# Patient Record
Sex: MALE | Race: ASIAN | NOT HISPANIC OR LATINO | URBAN - METROPOLITAN AREA
[De-identification: names, ages, dates, MRNs, and addresses within clinical notes are randomized per-mention and may not be internally consistent; named-entity substitution may affect disease eponyms.]

---

## 2020-06-22 ENCOUNTER — INPATIENT (INPATIENT)
Facility: HOSPITAL | Age: 70
LOS: 1 days | Discharge: LEFT BEFORE DISCHARGE | End: 2020-06-22
Attending: INTERNAL MEDICINE | Admitting: INTERNAL MEDICINE
Payer: MEDICARE

## 2020-06-22 VITALS
RESPIRATION RATE: 16 BRPM | TEMPERATURE: 98 F | DIASTOLIC BLOOD PRESSURE: 89 MMHG | OXYGEN SATURATION: 98 % | HEART RATE: 80 BPM | SYSTOLIC BLOOD PRESSURE: 139 MMHG

## 2020-06-22 VITALS
RESPIRATION RATE: 18 BRPM | TEMPERATURE: 98 F | SYSTOLIC BLOOD PRESSURE: 162 MMHG | OXYGEN SATURATION: 98 % | HEART RATE: 69 BPM | DIASTOLIC BLOOD PRESSURE: 90 MMHG

## 2020-06-22 DIAGNOSIS — R55 SYNCOPE AND COLLAPSE: ICD-10-CM

## 2020-06-22 LAB
ALBUMIN SERPL ELPH-MCNC: 3.9 G/DL — SIGNIFICANT CHANGE UP (ref 3.3–5)
ALP SERPL-CCNC: 42 U/L — SIGNIFICANT CHANGE UP (ref 40–120)
ALT FLD-CCNC: 23 U/L — SIGNIFICANT CHANGE UP (ref 4–41)
ANION GAP SERPL CALC-SCNC: 15 MMO/L — HIGH (ref 7–14)
APPEARANCE UR: CLEAR — SIGNIFICANT CHANGE UP
AST SERPL-CCNC: 31 U/L — SIGNIFICANT CHANGE UP (ref 4–40)
BASOPHILS # BLD AUTO: 0.03 K/UL — SIGNIFICANT CHANGE UP (ref 0–0.2)
BASOPHILS NFR BLD AUTO: 0.5 % — SIGNIFICANT CHANGE UP (ref 0–2)
BILIRUB SERPL-MCNC: 0.9 MG/DL — SIGNIFICANT CHANGE UP (ref 0.2–1.2)
BILIRUB UR-MCNC: NEGATIVE — SIGNIFICANT CHANGE UP
BLOOD UR QL VISUAL: NEGATIVE — SIGNIFICANT CHANGE UP
BUN SERPL-MCNC: 13 MG/DL — SIGNIFICANT CHANGE UP (ref 7–23)
CALCIUM SERPL-MCNC: 9.2 MG/DL — SIGNIFICANT CHANGE UP (ref 8.4–10.5)
CHLORIDE SERPL-SCNC: 99 MMOL/L — SIGNIFICANT CHANGE UP (ref 98–107)
CO2 SERPL-SCNC: 24 MMOL/L — SIGNIFICANT CHANGE UP (ref 22–31)
COLOR SPEC: SIGNIFICANT CHANGE UP
CREAT SERPL-MCNC: 0.89 MG/DL — SIGNIFICANT CHANGE UP (ref 0.5–1.3)
EOSINOPHIL # BLD AUTO: 0.08 K/UL — SIGNIFICANT CHANGE UP (ref 0–0.5)
EOSINOPHIL NFR BLD AUTO: 1.2 % — SIGNIFICANT CHANGE UP (ref 0–6)
GLUCOSE SERPL-MCNC: 106 MG/DL — HIGH (ref 70–99)
GLUCOSE UR-MCNC: NEGATIVE — SIGNIFICANT CHANGE UP
HCT VFR BLD CALC: 43.2 % — SIGNIFICANT CHANGE UP (ref 39–50)
HGB BLD-MCNC: 14.6 G/DL — SIGNIFICANT CHANGE UP (ref 13–17)
IMM GRANULOCYTES NFR BLD AUTO: 0.5 % — SIGNIFICANT CHANGE UP (ref 0–1.5)
KETONES UR-MCNC: NEGATIVE — SIGNIFICANT CHANGE UP
LEUKOCYTE ESTERASE UR-ACNC: NEGATIVE — SIGNIFICANT CHANGE UP
LYMPHOCYTES # BLD AUTO: 1.75 K/UL — SIGNIFICANT CHANGE UP (ref 1–3.3)
LYMPHOCYTES # BLD AUTO: 26.6 % — SIGNIFICANT CHANGE UP (ref 13–44)
MCHC RBC-ENTMCNC: 30.5 PG — SIGNIFICANT CHANGE UP (ref 27–34)
MCHC RBC-ENTMCNC: 33.8 % — SIGNIFICANT CHANGE UP (ref 32–36)
MCV RBC AUTO: 90.4 FL — SIGNIFICANT CHANGE UP (ref 80–100)
MONOCYTES # BLD AUTO: 0.75 K/UL — SIGNIFICANT CHANGE UP (ref 0–0.9)
MONOCYTES NFR BLD AUTO: 11.4 % — SIGNIFICANT CHANGE UP (ref 2–14)
NEUTROPHILS # BLD AUTO: 3.93 K/UL — SIGNIFICANT CHANGE UP (ref 1.8–7.4)
NEUTROPHILS NFR BLD AUTO: 59.8 % — SIGNIFICANT CHANGE UP (ref 43–77)
NITRITE UR-MCNC: NEGATIVE — SIGNIFICANT CHANGE UP
NRBC # FLD: 0 K/UL — SIGNIFICANT CHANGE UP (ref 0–0)
PH UR: 7 — SIGNIFICANT CHANGE UP (ref 5–8)
PLATELET # BLD AUTO: 222 K/UL — SIGNIFICANT CHANGE UP (ref 150–400)
PMV BLD: 10.1 FL — SIGNIFICANT CHANGE UP (ref 7–13)
POTASSIUM SERPL-MCNC: 3.7 MMOL/L — SIGNIFICANT CHANGE UP (ref 3.5–5.3)
POTASSIUM SERPL-SCNC: 3.7 MMOL/L — SIGNIFICANT CHANGE UP (ref 3.5–5.3)
PROT SERPL-MCNC: 7 G/DL — SIGNIFICANT CHANGE UP (ref 6–8.3)
PROT UR-MCNC: NEGATIVE — SIGNIFICANT CHANGE UP
RBC # BLD: 4.78 M/UL — SIGNIFICANT CHANGE UP (ref 4.2–5.8)
RBC # FLD: 13.3 % — SIGNIFICANT CHANGE UP (ref 10.3–14.5)
SODIUM SERPL-SCNC: 138 MMOL/L — SIGNIFICANT CHANGE UP (ref 135–145)
SP GR SPEC: 1.01 — SIGNIFICANT CHANGE UP (ref 1–1.04)
TROPONIN T, HIGH SENSITIVITY: 8 NG/L — SIGNIFICANT CHANGE UP (ref ?–14)
UROBILINOGEN FLD QL: NORMAL — SIGNIFICANT CHANGE UP
WBC # BLD: 6.57 K/UL — SIGNIFICANT CHANGE UP (ref 3.8–10.5)
WBC # FLD AUTO: 6.57 K/UL — SIGNIFICANT CHANGE UP (ref 3.8–10.5)

## 2020-06-22 PROCEDURE — 99285 EMERGENCY DEPT VISIT HI MDM: CPT
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
## 2020-06-22 NOTE — ED PROVIDER NOTE - PHYSICAL EXAMINATION
Gen: well appearing male NAD   HEENT: NCAT, EOMI and nonpainful, visual fields intact, visual acuity grossly intact, no erythema to conjunctiva, no discharge, no proptosis, pupils 3mm and equal no nasal discharge, mucous membranes moist  CV: RRR, +S1/S2, no M/R/G  Resp: CTAB, no W/R/R  GI: Abdomen soft non-distended, NTTP, no masses  MSK: No open wounds, no bruising, no LE edema  Neuro: A&Ox4, following commands, moving all four extremities spontaneously  Psych: appropriate mood

## 2020-06-22 NOTE — CHART NOTE - NSCHARTNOTEFT_GEN_A_CORE
:[id number]  Pt was admitted to medicine for syncope workup. After he was admitted, but was still in ED.  He decided that he wants to  go home and will pursue outpatient workup. Risk of leaving including death was explained to patient and he expressed understanding and is fully oriented. Case discussed with Dr. Melgar.   Pt left AMA.  Evelio Sawyer, Internal Medicine  [id number]
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
## 2020-06-23 LAB
CULTURE RESULTS: SIGNIFICANT CHANGE UP
SARS-COV-2 RNA SPEC QL NAA+PROBE: SIGNIFICANT CHANGE UP
SPECIMEN SOURCE: SIGNIFICANT CHANGE UP

## 2020-07-16 ENCOUNTER — APPOINTMENT (OUTPATIENT)
Dept: MRI IMAGING | Facility: IMAGING CENTER | Age: 70
End: 2020-07-16

## 2021-09-24 ENCOUNTER — APPOINTMENT (OUTPATIENT)
Dept: UROLOGY | Facility: CLINIC | Age: 71
End: 2021-09-24
Payer: MEDICARE

## 2021-09-24 VITALS
HEIGHT: 68 IN | RESPIRATION RATE: 16 BRPM | BODY MASS INDEX: 25.61 KG/M2 | TEMPERATURE: 98.6 F | SYSTOLIC BLOOD PRESSURE: 134 MMHG | DIASTOLIC BLOOD PRESSURE: 74 MMHG | HEART RATE: 70 BPM | WEIGHT: 169 LBS

## 2021-09-24 DIAGNOSIS — K40.90 UNILATERAL INGUINAL HERNIA, W/OUT OBSTRUCTION OR GANGRENE, NOT SPECIFIED AS RECURRENT: ICD-10-CM

## 2021-09-24 PROCEDURE — 99204 OFFICE O/P NEW MOD 45 MIN: CPT

## 2021-09-24 NOTE — HISTORY OF PRESENT ILLNESS
[FreeTextEntry1] : P/t is a 71y/o M with a pmh of htn and partial gastrectomy who is a new patient in our office today for elevated PSA.  Also h/o BPH/LUTS for past >10 years.\par Reports weak stream, occasional streaming, postvoid dribbling, nocturia x1.\par Not overly bothered by LUTS and reports mild.  Not interested in medication.\par \par Recent PSA is 7.8.\par Pt states his PSA has been rising over past couple of years.  No family hx of CaP. [Urinary Incontinence] : no urinary incontinence [Dysuria] : no dysuria [Hematuria - Gross] : no gross hematuria

## 2021-09-24 NOTE — ASSESSMENT
[FreeTextEntry1] : Patient is a 69 yo M who presents for elevated PSA.\par Mild BPH/LUTS - not interested in medication\par D/w pt that for L inguinal hernia he should see Gen surgery - easily reducible\par I had a long discussion with the pt regarding the significance of the elevated PSA and its implications including possibility of benign and malignant etiologies for elevated PSA. I discussed that although PSA is not a perfect test for prostate cancer, it is the most well studied screening tool for prostate cancer. I discussed that his value is considered abnormal and discussed with pt that routinely prostate biopsy is recommended to determine if there is cancer. I discussed with pt that I would first obtain prostate MRI to better risk stratify if any obvious lesions on MRI prior to any biopsy, if insurance covers.  \par If MRI not covered would perform prostate biopsy\par

## 2021-09-24 NOTE — PHYSICAL EXAM
[General Appearance - Well Developed] : well developed [General Appearance - Well Nourished] : well nourished [Normal Appearance] : normal appearance [Well Groomed] : well groomed [General Appearance - In No Acute Distress] : no acute distress [Edema] : no peripheral edema [Respiration, Rhythm And Depth] : normal respiratory rhythm and effort [Exaggerated Use Of Accessory Muscles For Inspiration] : no accessory muscle use [Abdomen Soft] : soft [Abdomen Tenderness] : non-tender [Costovertebral Angle Tenderness] : no ~M costovertebral angle tenderness [Urethral Meatus] : meatus normal [Urinary Bladder Findings] : the bladder was normal on palpation [Scrotum] : the scrotum was normal [Epididymis] : the epididymides were normal [Testes Tenderness] : no tenderness of the testes [Testes Mass (___cm)] : there were no testicular masses [No Prostate Nodules] : no prostate nodules [FreeTextEntry1] : DOLORES firm prostate R lat lobe, no nodule or induration [Normal Station and Gait] : the gait and station were normal for the patient's age [] : no rash [No Focal Deficits] : no focal deficits [Oriented To Time, Place, And Person] : oriented to person, place, and time [Affect] : the affect was normal [Not Anxious] : not anxious [Mood] : the mood was normal

## 2021-09-27 ENCOUNTER — APPOINTMENT (OUTPATIENT)
Age: 71
End: 2021-09-27

## 2021-09-27 LAB — PSA SERPL-MCNC: 9.25 NG/ML

## 2021-10-04 ENCOUNTER — RESULT REVIEW (OUTPATIENT)
Age: 71
End: 2021-10-04

## 2021-10-04 ENCOUNTER — OUTPATIENT (OUTPATIENT)
Dept: OUTPATIENT SERVICES | Facility: HOSPITAL | Age: 71
LOS: 1 days | End: 2021-10-04
Payer: COMMERCIAL

## 2021-10-04 ENCOUNTER — APPOINTMENT (OUTPATIENT)
Dept: MRI IMAGING | Facility: IMAGING CENTER | Age: 71
End: 2021-10-04
Payer: MEDICARE

## 2021-10-04 DIAGNOSIS — R97.20 ELEVATED PROSTATE SPECIFIC ANTIGEN [PSA]: ICD-10-CM

## 2021-10-04 PROCEDURE — A9585: CPT

## 2021-10-04 PROCEDURE — 72197 MRI PELVIS W/O & W/DYE: CPT

## 2021-10-04 PROCEDURE — 76498 UNLISTED MR PROCEDURE: CPT

## 2021-10-04 PROCEDURE — 76498P: CUSTOM | Mod: 26

## 2021-10-04 PROCEDURE — 72197 MRI PELVIS W/O & W/DYE: CPT | Mod: 26

## 2021-10-12 ENCOUNTER — APPOINTMENT (OUTPATIENT)
Dept: UROLOGY | Facility: CLINIC | Age: 71
End: 2021-10-12
Payer: MEDICARE

## 2021-10-12 PROBLEM — I10 ESSENTIAL (PRIMARY) HYPERTENSION: Chronic | Status: ACTIVE | Noted: 2020-06-22

## 2021-10-12 PROCEDURE — 99213 OFFICE O/P EST LOW 20 MIN: CPT

## 2021-10-13 ENCOUNTER — NON-APPOINTMENT (OUTPATIENT)
Age: 71
End: 2021-10-13

## 2021-10-14 ENCOUNTER — APPOINTMENT (OUTPATIENT)
Age: 71
End: 2021-10-14

## 2021-10-14 LAB
APPEARANCE: CLEAR
BILIRUBIN URINE: NEGATIVE
BLOOD URINE: NEGATIVE
COLOR: NORMAL
GLUCOSE QUALITATIVE U: NEGATIVE
KETONES URINE: NEGATIVE
LEUKOCYTE ESTERASE URINE: NEGATIVE
NITRITE URINE: NEGATIVE
PH URINE: 6.5
PROTEIN URINE: NEGATIVE
SPECIFIC GRAVITY URINE: 1.01
UROBILINOGEN URINE: NORMAL

## 2021-10-14 NOTE — HISTORY OF PRESENT ILLNESS
[FreeTextEntry1] : P/t is a 71y/o M with a pmh of htn and partial gastrectomy who is a new patient in our office today for elevated PSA.  Also h/o BPH/LUTS for past >10 years.\par Reports weak stream, occasional streaming, postvoid dribbling, nocturia x1.\par Not overly bothered by LUTS and reports mild.  Not interested in medication.\par \par Recent PSA is 7.8.\par Pt states his PSA has been rising over past couple of years.  No family hx of CaP.\par Repeat PSA was 9.25.\par Prostate MRI showed 26 cc gland and PiRADS 4 lesion.\par \par Here to discuss MRI results and next steps.

## 2021-10-14 NOTE — ASSESSMENT
[FreeTextEntry1] : Patient is a 71 yo M who presents for elevated PSA.\par Prostate MRI showed 26 cc gland and PiRADS 4 lesion.\par \par D/w pt the MRI results - d/w pt that need to pursue MRI fusion prostate biopsy to target the MRI lesion\par D/w pt MRI fusion biopsy process\par D/w pt that would refer to my partner Dr Canela to perform as I do not perform these specialized biopsies\par F/u with Dr Canela

## 2021-10-27 DIAGNOSIS — R07.9 CHEST PAIN, UNSPECIFIED: ICD-10-CM

## 2021-10-27 DIAGNOSIS — R97.20 ELEVATED PROSTATE, SPECIFIC ANTIGEN [PSA]: ICD-10-CM

## 2021-10-27 DIAGNOSIS — Z00.00 ENCOUNTER FOR GENERAL ADULT MEDICAL EXAMINATION W/OUT ABNORMAL FINDINGS: ICD-10-CM

## 2021-10-28 ENCOUNTER — APPOINTMENT (OUTPATIENT)
Dept: UROLOGY | Facility: CLINIC | Age: 71
End: 2021-10-28
Payer: MEDICARE

## 2021-10-28 ENCOUNTER — OUTPATIENT (OUTPATIENT)
Dept: OUTPATIENT SERVICES | Facility: HOSPITAL | Age: 71
LOS: 1 days | End: 2021-10-28
Payer: COMMERCIAL

## 2021-10-28 VITALS — DIASTOLIC BLOOD PRESSURE: 84 MMHG | SYSTOLIC BLOOD PRESSURE: 163 MMHG

## 2021-10-28 VITALS
SYSTOLIC BLOOD PRESSURE: 136 MMHG | RESPIRATION RATE: 17 BRPM | BODY MASS INDEX: 25.61 KG/M2 | HEIGHT: 68 IN | DIASTOLIC BLOOD PRESSURE: 76 MMHG | HEART RATE: 71 BPM | WEIGHT: 169 LBS

## 2021-10-28 DIAGNOSIS — R97.20 ELEVATED PROSTATE SPECIFIC ANTIGEN [PSA]: ICD-10-CM

## 2021-10-28 DIAGNOSIS — R35.0 FREQUENCY OF MICTURITION: ICD-10-CM

## 2021-10-28 PROCEDURE — 76872 US TRANSRECTAL: CPT | Mod: 26

## 2021-10-28 PROCEDURE — 76942 ECHO GUIDE FOR BIOPSY: CPT | Mod: 26,59

## 2021-10-28 PROCEDURE — 76942 ECHO GUIDE FOR BIOPSY: CPT | Mod: 59

## 2021-10-28 PROCEDURE — 55700: CPT

## 2021-10-28 PROCEDURE — 76872 US TRANSRECTAL: CPT

## 2021-10-28 RX ORDER — ENEMA 19; 7 G/133ML; G/133ML
7-19 ENEMA RECTAL
Qty: 1 | Refills: 0 | Status: COMPLETED | COMMUNITY
Start: 2021-10-12 | End: 2021-10-28

## 2021-10-28 RX ORDER — ENEMA 19; 7 G/133ML; G/133ML
7-19 ENEMA RECTAL
Qty: 2 | Refills: 0 | Status: COMPLETED | COMMUNITY
Start: 2021-10-01 | End: 2021-10-28

## 2021-10-29 ENCOUNTER — NON-APPOINTMENT (OUTPATIENT)
Age: 71
End: 2021-10-29

## 2021-11-01 LAB — CORE LAB BIOPSY: NORMAL

## 2021-11-04 ENCOUNTER — APPOINTMENT (OUTPATIENT)
Dept: UROLOGY | Facility: CLINIC | Age: 71
End: 2021-11-04
Payer: MEDICARE

## 2021-11-04 VITALS
RESPIRATION RATE: 17 BRPM | OXYGEN SATURATION: 99 % | BODY MASS INDEX: 18.25 KG/M2 | TEMPERATURE: 98.3 F | SYSTOLIC BLOOD PRESSURE: 131 MMHG | WEIGHT: 120 LBS | DIASTOLIC BLOOD PRESSURE: 74 MMHG | HEART RATE: 83 BPM

## 2021-11-04 PROCEDURE — 99214 OFFICE O/P EST MOD 30 MIN: CPT

## 2021-11-04 NOTE — ASSESSMENT
[FreeTextEntry1] : Patient is a 71 yo M who presents for intermediate risk prostate cancer GG3.\par \par I had a long discussion with pt regarding his prostate cancer diagnosis, implications and options for tx of his disease, including AS, surgery, ablation, and radiation.\par I discussed with pt the risks/benefits/alternatives of each tx -- incl risks of ED, incontinence, BNC, need for additional procedure with surgery.  Discussed risks of ED, incontinence, urinary symptoms, stricture. hematuria and/or risks of radiation damage/cystitis for radiation and cryo/ablation.  I discussed that the long-term outcomes for radiation and surgery are similar, and there are less long-term outcome data for ablation/cryo.  I discussed that given pt's bravo score and pathology results I would not recommend active surveillance.  Given his life expectancy likely exceeds >10yrs, I recommend treatment either with surgery or radiation. \par I discussed that there are risks of both radiation and surgery.  Surgical risks are immediate and improve with time, whereas radiation risks increase with time.  I discuss with pt that risks of incontinence are immediate but most pts regain incontinence over time.  Radiation will initially have minimal side effects, but they develop over >5 yrs.  However radiation side effects are very difficult to treat. \par MRI prostate no LAD\par Bone scan\par F/u after bone scan\par He is more interested in surgery\par

## 2021-11-04 NOTE — HISTORY OF PRESENT ILLNESS
[FreeTextEntry1] : P/t is a 71y/o M with a pmh of htn and partial gastrectomy here for initially for elevated PSA.  Also h/o BPH/LUTS for past >10 years.\par Partial gastrectomy was for ulcer, not for cancer.\par Reports weak stream, occasional streaming, postvoid dribbling, nocturia x1.\par Not overly bothered by LUTS and reports mild.  Not interested in medication.\par \par Recent PSA is 7.8.\par Pt states his PSA has been rising over past couple of years.  No family hx of CaP.\par Repeat PSA was 9.25.\par Prostate MRI showed 26 cc gland and PiRADS 4 lesion.\par S/p prostate MRI fusion biospy.  Pathology showed Jason 4+3 and 3+4 diffusely in L gland.\par Doing well post bx.  no fever/chills or hematuria.\par No pain.

## 2021-11-16 ENCOUNTER — APPOINTMENT (OUTPATIENT)
Dept: UROLOGY | Facility: CLINIC | Age: 71
End: 2021-11-16
Payer: MEDICARE

## 2021-11-16 VITALS — SYSTOLIC BLOOD PRESSURE: 121 MMHG | DIASTOLIC BLOOD PRESSURE: 73 MMHG | HEART RATE: 78 BPM

## 2021-11-16 PROCEDURE — 99215 OFFICE O/P EST HI 40 MIN: CPT

## 2021-11-19 ENCOUNTER — OUTPATIENT (OUTPATIENT)
Dept: OUTPATIENT SERVICES | Facility: HOSPITAL | Age: 71
LOS: 1 days | End: 2021-11-19
Payer: MEDICARE

## 2021-11-19 ENCOUNTER — RESULT REVIEW (OUTPATIENT)
Age: 71
End: 2021-11-19

## 2021-11-19 ENCOUNTER — APPOINTMENT (OUTPATIENT)
Dept: NUCLEAR MEDICINE | Facility: IMAGING CENTER | Age: 71
End: 2021-11-19
Payer: MEDICARE

## 2021-11-19 DIAGNOSIS — C61 MALIGNANT NEOPLASM OF PROSTATE: ICD-10-CM

## 2021-11-19 DIAGNOSIS — Z00.8 ENCOUNTER FOR OTHER GENERAL EXAMINATION: ICD-10-CM

## 2021-11-19 PROCEDURE — 78306 BONE IMAGING WHOLE BODY: CPT | Mod: 26

## 2021-11-19 PROCEDURE — 78830 RP LOCLZJ TUM SPECT W/CT 1: CPT | Mod: 26

## 2021-11-19 PROCEDURE — A9561: CPT

## 2021-11-19 PROCEDURE — 78830 RP LOCLZJ TUM SPECT W/CT 1: CPT

## 2021-11-19 PROCEDURE — 78306 BONE IMAGING WHOLE BODY: CPT

## 2021-11-29 ENCOUNTER — APPOINTMENT (OUTPATIENT)
Dept: UROLOGY | Facility: CLINIC | Age: 71
End: 2021-11-29
Payer: MEDICARE

## 2021-11-29 VITALS
SYSTOLIC BLOOD PRESSURE: 114 MMHG | RESPIRATION RATE: 17 BRPM | BODY MASS INDEX: 18.25 KG/M2 | HEART RATE: 75 BPM | OXYGEN SATURATION: 98 % | WEIGHT: 120 LBS | DIASTOLIC BLOOD PRESSURE: 64 MMHG | TEMPERATURE: 98 F

## 2021-11-29 PROCEDURE — 99213 OFFICE O/P EST LOW 20 MIN: CPT

## 2021-11-29 NOTE — HISTORY OF PRESENT ILLNESS
[FreeTextEntry1] : Pt is a 69y/o M with a pmh of htn and partial gastrectomy who presents for prostate cancer.\par  \par Also h/o BPH/LUTS for past >10 years.\par Reports weak stream, occasional streaming, postvoid dribbling, nocturia x1.\par Not overly bothered by LUTS and reports mild.  Not interested in medication.\par \par Recent PSA is 7.8.\par Pt states his PSA has been rising over past couple of years.  No family hx of CaP.\par Repeat PSA was 9.25.\par Prostate MRI showed 26 cc gland and PiRADS 4 lesion.\par \par MRI fusion biopsy showed diffuse left sided GG3 prostate cancer.\par Bone scan is negative for bony mets.  MRI without LAD.\par \par Pt was referred to Dr Cruz, he is interested in pursuing RALP.

## 2021-11-29 NOTE — ASSESSMENT
[FreeTextEntry1] : Patient is a 69 yo M who presents for intermediate risk prostate cancer GG3.\par \par MRI prostate no LAD\par Bone scan neg\par He is interested in pursuing prostate removal with RALP with Dr Cruz\par F/u with Dr Cruz for RALP

## 2021-12-02 ENCOUNTER — APPOINTMENT (OUTPATIENT)
Age: 71
End: 2021-12-02

## 2021-12-08 ENCOUNTER — APPOINTMENT (OUTPATIENT)
Age: 71
End: 2021-12-08

## 2021-12-22 ENCOUNTER — OUTPATIENT (OUTPATIENT)
Dept: OUTPATIENT SERVICES | Facility: HOSPITAL | Age: 71
LOS: 1 days | End: 2021-12-22
Payer: MEDICARE

## 2021-12-22 VITALS
DIASTOLIC BLOOD PRESSURE: 84 MMHG | TEMPERATURE: 97 F | SYSTOLIC BLOOD PRESSURE: 130 MMHG | HEART RATE: 73 BPM | HEIGHT: 65 IN | WEIGHT: 113.98 LBS | OXYGEN SATURATION: 98 % | RESPIRATION RATE: 16 BRPM

## 2021-12-22 DIAGNOSIS — C61 MALIGNANT NEOPLASM OF PROSTATE: ICD-10-CM

## 2021-12-22 DIAGNOSIS — Z90.3 ACQUIRED ABSENCE OF STOMACH [PART OF]: Chronic | ICD-10-CM

## 2021-12-22 DIAGNOSIS — N40.0 BENIGN PROSTATIC HYPERPLASIA WITHOUT LOWER URINARY TRACT SYMPTOMS: ICD-10-CM

## 2021-12-22 LAB
ALBUMIN SERPL ELPH-MCNC: 4.3 G/DL — SIGNIFICANT CHANGE UP (ref 3.3–5)
ALP SERPL-CCNC: 56 U/L — SIGNIFICANT CHANGE UP (ref 40–120)
ALT FLD-CCNC: 31 U/L — SIGNIFICANT CHANGE UP (ref 4–41)
ANION GAP SERPL CALC-SCNC: 14 MMOL/L — SIGNIFICANT CHANGE UP (ref 7–14)
AST SERPL-CCNC: 32 U/L — SIGNIFICANT CHANGE UP (ref 4–40)
BILIRUB SERPL-MCNC: 1 MG/DL — SIGNIFICANT CHANGE UP (ref 0.2–1.2)
BLD GP AB SCN SERPL QL: NEGATIVE — SIGNIFICANT CHANGE UP
BUN SERPL-MCNC: 14 MG/DL — SIGNIFICANT CHANGE UP (ref 7–23)
CALCIUM SERPL-MCNC: 9.4 MG/DL — SIGNIFICANT CHANGE UP (ref 8.4–10.5)
CHLORIDE SERPL-SCNC: 100 MMOL/L — SIGNIFICANT CHANGE UP (ref 98–107)
CO2 SERPL-SCNC: 24 MMOL/L — SIGNIFICANT CHANGE UP (ref 22–31)
CREAT SERPL-MCNC: 0.8 MG/DL — SIGNIFICANT CHANGE UP (ref 0.5–1.3)
GLUCOSE SERPL-MCNC: 83 MG/DL — SIGNIFICANT CHANGE UP (ref 70–99)
HCT VFR BLD CALC: 43.8 % — SIGNIFICANT CHANGE UP (ref 39–50)
HGB BLD-MCNC: 14.5 G/DL — SIGNIFICANT CHANGE UP (ref 13–17)
MCHC RBC-ENTMCNC: 29.5 PG — SIGNIFICANT CHANGE UP (ref 27–34)
MCHC RBC-ENTMCNC: 33.1 GM/DL — SIGNIFICANT CHANGE UP (ref 32–36)
MCV RBC AUTO: 89.2 FL — SIGNIFICANT CHANGE UP (ref 80–100)
NRBC # BLD: 0 /100 WBCS — SIGNIFICANT CHANGE UP
NRBC # FLD: 0 K/UL — SIGNIFICANT CHANGE UP
PLATELET # BLD AUTO: 246 K/UL — SIGNIFICANT CHANGE UP (ref 150–400)
POTASSIUM SERPL-MCNC: 3.8 MMOL/L — SIGNIFICANT CHANGE UP (ref 3.5–5.3)
POTASSIUM SERPL-SCNC: 3.8 MMOL/L — SIGNIFICANT CHANGE UP (ref 3.5–5.3)
PROT SERPL-MCNC: 7.1 G/DL — SIGNIFICANT CHANGE UP (ref 6–8.3)
RBC # BLD: 4.91 M/UL — SIGNIFICANT CHANGE UP (ref 4.2–5.8)
RBC # FLD: 13.8 % — SIGNIFICANT CHANGE UP (ref 10.3–14.5)
RH IG SCN BLD-IMP: POSITIVE — SIGNIFICANT CHANGE UP
SODIUM SERPL-SCNC: 138 MMOL/L — SIGNIFICANT CHANGE UP (ref 135–145)
WBC # BLD: 6.1 K/UL — SIGNIFICANT CHANGE UP (ref 3.8–10.5)
WBC # FLD AUTO: 6.1 K/UL — SIGNIFICANT CHANGE UP (ref 3.8–10.5)

## 2021-12-22 PROCEDURE — 93010 ELECTROCARDIOGRAM REPORT: CPT

## 2021-12-22 NOTE — H&P PST ADULT - FALL HARM RISK - UNIVERSAL INTERVENTIONS
Bed in lowest position, wheels locked, appropriate side rails in place/Call bell, personal items and telephone in reach/Instruct patient to call for assistance before getting out of bed or chair/Non-slip footwear when patient is out of bed/[place] to call system/Physically safe environment - no spills, clutter or unnecessary equipment/Purposeful Proactive Rounding/Room/bathroom lighting operational, light cord in reach

## 2021-12-22 NOTE — H&P PST ADULT - PROBLEM SELECTOR PLAN 1
Pt scheduled for single port robotic assisted radical prostatectomy, pelvic lymph node dissection.   labs pending, ekg in chart. Preop instructions provided to pt using language line solutions .  Preop instructions provided to pt.   famotidine and chlorhexidine scrubs provided to pt with instructions.   Pt advised to obtain medical clearance prior to DOS - will obtain copy.   Pt also instructed to obtain covid pcr 3 days prior to DOS

## 2021-12-22 NOTE — H&P PST ADULT - ASSESSMENT
71 yrs old male with h/o BPH, elevated PSA and prostate cancer presents for preop to have single port robotic assisted radical prostatectomy, pelvic lymph node dissection.   Pt speaks Mandarin used language line solutions to communicate with the pt. ID # [id number] and name Lauro

## 2021-12-22 NOTE — H&P PST ADULT - RS GEN PE MLT RESP DETAILS PC
airway patent/good air movement/respirations non-labored/clear to auscultation bilaterally/no rales/no wheezes

## 2021-12-22 NOTE — H&P PST ADULT - HISTORY OF PRESENT ILLNESS
71 yrs old male with h/o BPH, elevated PSA and prostate cancer presents for preop to have single port robotic assisted radical prostatectomy, pelvic lymph node dissection.   Pt speaks Mandarin used language line solutions to communicate with the pt. ID # [id number] and name Lauro

## 2021-12-22 NOTE — H&P PST ADULT - NSANTHOSAYNRD_GEN_A_CORE
never tested/No. RAMÍREZ screening performed.  STOP BANG Legend: 0-2 = LOW Risk; 3-4 = INTERMEDIATE Risk; 5-8 = HIGH Risk

## 2021-12-22 NOTE — H&P PST ADULT - NSICDXPASTMEDICALHX_GEN_ALL_CORE_FT
PAST MEDICAL HISTORY:  BPH with elevated PSA     Gastric ulcer     H/O inguinal hernia left    HTN (hypertension)     Prostate cancer

## 2021-12-23 LAB
CULTURE RESULTS: SIGNIFICANT CHANGE UP
SPECIMEN SOURCE: SIGNIFICANT CHANGE UP

## 2022-01-03 ENCOUNTER — TRANSCRIPTION ENCOUNTER (OUTPATIENT)
Age: 72
End: 2022-01-03

## 2022-01-03 NOTE — ASU PATIENT PROFILE, ADULT - FALL HARM RISK - UNIVERSAL INTERVENTIONS
Bed in lowest position, wheels locked, appropriate side rails in place/Call bell, personal items and telephone in reach/Instruct patient to call for assistance before getting out of bed or chair/Non-slip footwear when patient is out of bed/[place] to call system/Physically safe environment - no spills, clutter or unnecessary equipment/Purposeful Proactive Rounding/Room/bathroom lighting operational, light cord in reach

## 2022-01-04 ENCOUNTER — APPOINTMENT (OUTPATIENT)
Dept: UROLOGY | Facility: HOSPITAL | Age: 72
End: 2022-01-04

## 2022-01-04 ENCOUNTER — INPATIENT (INPATIENT)
Facility: HOSPITAL | Age: 72
LOS: 2 days | Discharge: HOME | End: 2022-01-07
Attending: UROLOGY | Admitting: UROLOGY
Payer: MEDICARE

## 2022-01-04 ENCOUNTER — RESULT REVIEW (OUTPATIENT)
Age: 72
End: 2022-01-04

## 2022-01-04 VITALS
WEIGHT: 113.98 LBS | HEIGHT: 65 IN | TEMPERATURE: 98 F | RESPIRATION RATE: 15 BRPM | SYSTOLIC BLOOD PRESSURE: 134 MMHG | OXYGEN SATURATION: 99 % | HEART RATE: 65 BPM | DIASTOLIC BLOOD PRESSURE: 74 MMHG

## 2022-01-04 DIAGNOSIS — C61 MALIGNANT NEOPLASM OF PROSTATE: ICD-10-CM

## 2022-01-04 DIAGNOSIS — Z90.3 ACQUIRED ABSENCE OF STOMACH [PART OF]: Chronic | ICD-10-CM

## 2022-01-04 PROCEDURE — 88305 TISSUE EXAM BY PATHOLOGIST: CPT | Mod: 26

## 2022-01-04 PROCEDURE — 55866 LAPS SURG PRST8ECT RPBIC RAD: CPT

## 2022-01-04 PROCEDURE — 38571 LAPAROSCOPY LYMPHADENECTOMY: CPT

## 2022-01-04 PROCEDURE — 88309 TISSUE EXAM BY PATHOLOGIST: CPT | Mod: 26

## 2022-01-04 DEVICE — CLIP APPLIER COVIDIEN ENDOCLIP II 10MM MED/LG: Type: IMPLANTABLE DEVICE | Status: FUNCTIONAL

## 2022-01-04 RX ORDER — HYDROCORTISONE 1 %
1 OINTMENT (GRAM) TOPICAL
Refills: 0 | Status: DISCONTINUED | OUTPATIENT
Start: 2022-01-04 | End: 2022-01-07

## 2022-01-04 RX ORDER — ACETAMINOPHEN 500 MG
975 TABLET ORAL EVERY 6 HOURS
Refills: 0 | Status: DISCONTINUED | OUTPATIENT
Start: 2022-01-04 | End: 2022-01-07

## 2022-01-04 RX ORDER — HYDROMORPHONE HYDROCHLORIDE 2 MG/ML
0.5 INJECTION INTRAMUSCULAR; INTRAVENOUS; SUBCUTANEOUS
Refills: 0 | Status: DISCONTINUED | OUTPATIENT
Start: 2022-01-04 | End: 2022-01-04

## 2022-01-04 RX ORDER — SODIUM CHLORIDE 9 MG/ML
1000 INJECTION, SOLUTION INTRAVENOUS
Refills: 0 | Status: DISCONTINUED | OUTPATIENT
Start: 2022-01-04 | End: 2022-01-04

## 2022-01-04 RX ORDER — SENNA PLUS 8.6 MG/1
2 TABLET ORAL AT BEDTIME
Refills: 0 | Status: DISCONTINUED | OUTPATIENT
Start: 2022-01-04 | End: 2022-01-07

## 2022-01-04 RX ORDER — LOSARTAN POTASSIUM 100 MG/1
50 TABLET, FILM COATED ORAL DAILY
Refills: 0 | Status: DISCONTINUED | OUTPATIENT
Start: 2022-01-04 | End: 2022-01-05

## 2022-01-04 RX ORDER — SODIUM CHLORIDE 9 MG/ML
1000 INJECTION, SOLUTION INTRAVENOUS
Refills: 0 | Status: DISCONTINUED | OUTPATIENT
Start: 2022-01-04 | End: 2022-01-06

## 2022-01-04 RX ORDER — POLYETHYLENE GLYCOL 3350 17 G/17G
17 POWDER, FOR SOLUTION ORAL DAILY
Refills: 0 | Status: DISCONTINUED | OUTPATIENT
Start: 2022-01-04 | End: 2022-01-07

## 2022-01-04 RX ORDER — HYDROCHLOROTHIAZIDE 25 MG
12.5 TABLET ORAL DAILY
Refills: 0 | Status: DISCONTINUED | OUTPATIENT
Start: 2022-01-04 | End: 2022-01-05

## 2022-01-04 RX ORDER — ONDANSETRON 8 MG/1
4 TABLET, FILM COATED ORAL ONCE
Refills: 0 | Status: DISCONTINUED | OUTPATIENT
Start: 2022-01-04 | End: 2022-01-04

## 2022-01-04 RX ORDER — LIDOCAINE 4 G/100G
1 CREAM TOPICAL
Refills: 0 | Status: DISCONTINUED | OUTPATIENT
Start: 2022-01-04 | End: 2022-01-07

## 2022-01-04 RX ORDER — BENZOCAINE AND MENTHOL 5; 1 G/100ML; G/100ML
1 LIQUID ORAL
Refills: 0 | Status: DISCONTINUED | OUTPATIENT
Start: 2022-01-04 | End: 2022-01-07

## 2022-01-04 RX ORDER — TRAMADOL HYDROCHLORIDE 50 MG/1
25 TABLET ORAL EVERY 6 HOURS
Refills: 0 | Status: DISCONTINUED | OUTPATIENT
Start: 2022-01-04 | End: 2022-01-07

## 2022-01-04 RX ORDER — KETOROLAC TROMETHAMINE 30 MG/ML
15 SYRINGE (ML) INJECTION EVERY 6 HOURS
Refills: 0 | Status: DISCONTINUED | OUTPATIENT
Start: 2022-01-04 | End: 2022-01-07

## 2022-01-04 RX ORDER — HEPARIN SODIUM 5000 [USP'U]/ML
5000 INJECTION INTRAVENOUS; SUBCUTANEOUS EVERY 8 HOURS
Refills: 0 | Status: DISCONTINUED | OUTPATIENT
Start: 2022-01-04 | End: 2022-01-07

## 2022-01-04 RX ORDER — LIDOCAINE 4 G/100G
1 CREAM TOPICAL DAILY
Refills: 0 | Status: DISCONTINUED | OUTPATIENT
Start: 2022-01-04 | End: 2022-01-07

## 2022-01-04 RX ADMIN — Medication 975 MILLIGRAM(S): at 23:41

## 2022-01-04 RX ADMIN — Medication 975 MILLIGRAM(S): at 17:25

## 2022-01-04 RX ADMIN — SODIUM CHLORIDE 125 MILLILITER(S): 9 INJECTION, SOLUTION INTRAVENOUS at 15:35

## 2022-01-04 RX ADMIN — LIDOCAINE 1 APPLICATION(S): 4 CREAM TOPICAL at 23:41

## 2022-01-04 RX ADMIN — HEPARIN SODIUM 5000 UNIT(S): 5000 INJECTION INTRAVENOUS; SUBCUTANEOUS at 23:40

## 2022-01-04 NOTE — CHART NOTE - NSCHARTNOTEFT_GEN_A_CORE
Post op Check: 72 yo M POD #0 RALP/LND    Pt seen and examined, with  services, c/o dry throat but otherwise without complaints. Pain is controlled. Denies SOB/CP/N/V.     Vital Signs Last 24 Hrs  T(C): 36.7 (04 Jan 2022 15:27), Max: 36.7 (04 Jan 2022 15:27)  T(F): 98.1 (04 Jan 2022 15:27), Max: 98.1 (04 Jan 2022 15:27)  HR: 84 (04 Jan 2022 15:27) (64 - 84)  BP: 121/72 (04 Jan 2022 15:27) (117/77 - 134/74)  BP(mean): 90 (04 Jan 2022 14:45) (82 - 90)  RR: 16 (04 Jan 2022 15:27) (14 - 19)  SpO2: 95% (04 Jan 2022 15:27) (95% - 100%)    I&O's Summary  Nawaf: 285 very light pink tinge  ONEAL: 50 SS  04 Jan 2022 07:01  -  04 Jan 2022 15:40  --------------------------------------------------------  IN: 370 mL / OUT: 335 mL / NET: 35 mL        Physical Exam  Gen: NAD  Pulm: No respiratory distress, clear to auscultation  CV: Reg  Abd: Dressings c/d/i soft, NT, ND, ONEAL dressing c/d/i  : Mccracken secured  Venodynes: In place                  Plan:   IVF: LR @125  Diet: CLD  Labs: In AM  Abx: None  Cepacol prn  Strict I&O's  Analgesia and antiemetics as needed  DVT prophylaxis/OOB  Incentive spirometry

## 2022-01-04 NOTE — PATIENT PROFILE ADULT - FALL HARM RISK - UNIVERSAL INTERVENTIONS
Bed in lowest position, wheels locked, appropriate side rails in place/Call bell, personal items and telephone in reach/Instruct patient to call for assistance before getting out of bed or chair/Non-slip footwear when patient is out of bed/[place] to call system/Physically safe environment - no spills, clutter or unnecessary equipment/Purposeful Proactive Rounding/Room/bathroom lighting operational, light cord in reach

## 2022-01-04 NOTE — BRIEF OPERATIVE NOTE - NSICDXBRIEFPROCEDURE_GEN_ALL_CORE_FT
PROCEDURES:  Robot-assisted radical prostatectomy with pelvic lymphadenectomy with cystoscopy 04-Jan-2022 13:38:18  Cruzito Rhoades

## 2022-01-05 ENCOUNTER — NON-APPOINTMENT (OUTPATIENT)
Age: 72
End: 2022-01-05

## 2022-01-05 LAB
ANION GAP SERPL CALC-SCNC: 11 MMOL/L — SIGNIFICANT CHANGE UP (ref 7–14)
BUN SERPL-MCNC: 13 MG/DL — SIGNIFICANT CHANGE UP (ref 7–23)
CALCIUM SERPL-MCNC: 8 MG/DL — LOW (ref 8.4–10.5)
CHLORIDE SERPL-SCNC: 105 MMOL/L — SIGNIFICANT CHANGE UP (ref 98–107)
CO2 SERPL-SCNC: 22 MMOL/L — SIGNIFICANT CHANGE UP (ref 22–31)
CREAT FLD-MCNC: 0.95 MG/DL — SIGNIFICANT CHANGE UP
CREAT SERPL-MCNC: 0.86 MG/DL — SIGNIFICANT CHANGE UP (ref 0.5–1.3)
GLUCOSE SERPL-MCNC: 99 MG/DL — SIGNIFICANT CHANGE UP (ref 70–99)
HCT VFR BLD CALC: 27.1 % — LOW (ref 39–50)
HCT VFR BLD CALC: 29.6 % — LOW (ref 39–50)
HGB BLD-MCNC: 8.8 G/DL — LOW (ref 13–17)
HGB BLD-MCNC: 9.5 G/DL — LOW (ref 13–17)
MAGNESIUM SERPL-MCNC: 1.8 MG/DL — SIGNIFICANT CHANGE UP (ref 1.6–2.6)
MCHC RBC-ENTMCNC: 29.1 PG — SIGNIFICANT CHANGE UP (ref 27–34)
MCHC RBC-ENTMCNC: 29.4 PG — SIGNIFICANT CHANGE UP (ref 27–34)
MCHC RBC-ENTMCNC: 32.1 GM/DL — SIGNIFICANT CHANGE UP (ref 32–36)
MCHC RBC-ENTMCNC: 32.5 GM/DL — SIGNIFICANT CHANGE UP (ref 32–36)
MCV RBC AUTO: 90.5 FL — SIGNIFICANT CHANGE UP (ref 80–100)
MCV RBC AUTO: 90.6 FL — SIGNIFICANT CHANGE UP (ref 80–100)
NRBC # BLD: 0 /100 WBCS — SIGNIFICANT CHANGE UP
NRBC # BLD: 0 /100 WBCS — SIGNIFICANT CHANGE UP
NRBC # FLD: 0 K/UL — SIGNIFICANT CHANGE UP
NRBC # FLD: 0 K/UL — SIGNIFICANT CHANGE UP
PHOSPHATE SERPL-MCNC: 3.6 MG/DL — SIGNIFICANT CHANGE UP (ref 2.5–4.5)
PLATELET # BLD AUTO: 173 K/UL — SIGNIFICANT CHANGE UP (ref 150–400)
PLATELET # BLD AUTO: 175 K/UL — SIGNIFICANT CHANGE UP (ref 150–400)
POTASSIUM SERPL-MCNC: 3.8 MMOL/L — SIGNIFICANT CHANGE UP (ref 3.5–5.3)
POTASSIUM SERPL-SCNC: 3.8 MMOL/L — SIGNIFICANT CHANGE UP (ref 3.5–5.3)
RBC # BLD: 2.99 M/UL — LOW (ref 4.2–5.8)
RBC # BLD: 3.27 M/UL — LOW (ref 4.2–5.8)
RBC # FLD: 13.9 % — SIGNIFICANT CHANGE UP (ref 10.3–14.5)
RBC # FLD: 14.1 % — SIGNIFICANT CHANGE UP (ref 10.3–14.5)
SODIUM SERPL-SCNC: 138 MMOL/L — SIGNIFICANT CHANGE UP (ref 135–145)
WBC # BLD: 7.12 K/UL — SIGNIFICANT CHANGE UP (ref 3.8–10.5)
WBC # BLD: 7.72 K/UL — SIGNIFICANT CHANGE UP (ref 3.8–10.5)
WBC # FLD AUTO: 7.12 K/UL — SIGNIFICANT CHANGE UP (ref 3.8–10.5)
WBC # FLD AUTO: 7.72 K/UL — SIGNIFICANT CHANGE UP (ref 3.8–10.5)

## 2022-01-05 PROCEDURE — 99222 1ST HOSP IP/OBS MODERATE 55: CPT

## 2022-01-05 RX ORDER — SODIUM CHLORIDE 9 MG/ML
1000 INJECTION INTRAMUSCULAR; INTRAVENOUS; SUBCUTANEOUS ONCE
Refills: 0 | Status: COMPLETED | OUTPATIENT
Start: 2022-01-05 | End: 2022-01-05

## 2022-01-05 RX ADMIN — LIDOCAINE 1 APPLICATION(S): 4 CREAM TOPICAL at 05:57

## 2022-01-05 RX ADMIN — HEPARIN SODIUM 5000 UNIT(S): 5000 INJECTION INTRAVENOUS; SUBCUTANEOUS at 05:57

## 2022-01-05 RX ADMIN — SODIUM CHLORIDE 1000 MILLILITER(S): 9 INJECTION INTRAMUSCULAR; INTRAVENOUS; SUBCUTANEOUS at 01:45

## 2022-01-05 RX ADMIN — HEPARIN SODIUM 5000 UNIT(S): 5000 INJECTION INTRAVENOUS; SUBCUTANEOUS at 13:11

## 2022-01-05 RX ADMIN — LIDOCAINE 1 PATCH: 4 CREAM TOPICAL at 13:10

## 2022-01-05 RX ADMIN — LIDOCAINE 1 PATCH: 4 CREAM TOPICAL at 18:21

## 2022-01-05 RX ADMIN — SODIUM CHLORIDE 75 MILLILITER(S): 9 INJECTION, SOLUTION INTRAVENOUS at 13:25

## 2022-01-05 RX ADMIN — Medication 1 APPLICATION(S): at 17:26

## 2022-01-05 RX ADMIN — SENNA PLUS 2 TABLET(S): 8.6 TABLET ORAL at 23:29

## 2022-01-05 RX ADMIN — Medication 975 MILLIGRAM(S): at 13:10

## 2022-01-05 RX ADMIN — Medication 975 MILLIGRAM(S): at 17:26

## 2022-01-05 RX ADMIN — HEPARIN SODIUM 5000 UNIT(S): 5000 INJECTION INTRAVENOUS; SUBCUTANEOUS at 23:29

## 2022-01-05 RX ADMIN — Medication 975 MILLIGRAM(S): at 05:57

## 2022-01-05 RX ADMIN — LIDOCAINE 1 APPLICATION(S): 4 CREAM TOPICAL at 17:29

## 2022-01-05 NOTE — PROGRESS NOTE ADULT - SUBJECTIVE AND OBJECTIVE BOX
Overnight events:  Pt w/ asymptomatic hypotension, normal HR (no beta blockade) given 1L NS bolus, good UO    Subjective:  With  phone pt states pain controlled, no N/V, +BM, no SOB, dizziness, palpitations, CP    Objective:    Vital signs  T(C): , Max: 37.1 (01-04-22 @ 18:02)  HR: 83 (01-05-22 @ 06:11)  BP: 91/51 (01-05-22 @ 06:11)  SpO2: 100% (01-05-22 @ 06:11)  Wt(kg): --    Output   Mccracken: 800 yellow  ONEAL: 100 (340/24hr) SS  01-04 @ 07:01  -  01-05 @ 07:00  --------------------------------------------------------  IN: 1745 mL / OUT: 1575 mL / NET: 170 mL        Gen: NAD  Abd: courtneyis c/d/i, soft, nontender, nondistended  : long secured    Labs                        9.5    7.12  )-----------( 173      ( 05 Jan 2022 05:30 )             29.6     05 Jan 2022 05:30    138    |  105    |  13     ----------------------------<  99     3.8     |  22     |  0.86     Ca    8.0        05 Jan 2022 05:30  Phos  3.6       05 Jan 2022 05:30  Mg     1.80      05 Jan 2022 05:30

## 2022-01-05 NOTE — PROGRESS NOTE ADULT - ASSESSMENT
72 yo M s/p RALP/LND 1/4/2022, had asymptomatic hypotension postop, treated w/ NS IV bolus    1/5: pt offers no complaints, +BM, urine yellow, ONEAL SS, BP soft but pt asymptomatic, good UO    Plan:  -AM labs reviewed  -repeat CBC at noon  -advance diet, monitor GI function  -alves/leg bag teaching  -DVT prophy, IS, OOB, ambulate   70 yo M s/p RALP/LND 1/4/2022, had asymptomatic hypotension postop, treated w/ NS IV bolus    1/5: pt offers no complaints, +BM, urine yellow, ONEAL SS, BP soft but pt asymptomatic, good UO, abdomen soft, nontender    Plan:  -AM labs reviewed  -repeat CBC at noon  -advance diet, monitor GI function  -alves/leg bag teaching  -DVT prophy, IS, OOB, ambulate

## 2022-01-05 NOTE — CONSULT NOTE ADULT - SUBJECTIVE AND OBJECTIVE BOX
HPI:     PAST MEDICAL & SURGICAL HISTORY:  HTN (hypertension)    BPH with elevated PSA    Prostate cancer    Gastric ulcer    H/O inguinal hernia  left    History of partial gastrectomy  1988    Review of Systems:   CONSTITUTIONAL: No fever, weight loss, or fatigue  EYES: No eye pain, visual disturbances, or discharge  ENMT:  No difficulty hearing, tinnitus, vertigo; No sinus or throat pain  NECK: No pain or stiffness  RESPIRATORY: No cough, wheezing, chills or hemoptysis; No shortness of breath  CARDIOVASCULAR: No chest pain, palpitations, dizziness, or leg swelling  GASTROINTESTINAL: No abdominal or epigastric pain. No nausea, vomiting, or hematemesis; No diarrhea or constipation. No melena or hematochezia.  GENITOURINARY: No dysuria, frequency, hematuria, or incontinence  NEUROLOGICAL: No headaches, memory loss, loss of strength, numbness, or tremors  SKIN: No itching, burning, rashes, or lesions   LYMPH NODES: No enlarged glands  ENDOCRINE: No heat or cold intolerance; No hair loss  MUSCULOSKELETAL: No joint pain or swelling; No muscle, back, or extremity pain  HEME/LYMPH: No easy bruising, or bleeding gums  ALLERY AND IMMUNOLOGIC: No hives or eczema    Allergies    No Known Allergies    Intolerances    Social History:     FAMILY HISTORY:    MEDICATIONS  (STANDING):  acetaminophen     Tablet .. 975 milliGRAM(s) Oral every 6 hours  heparin   Injectable 5000 Unit(s) SubCutaneous every 8 hours  hydrochlorothiazide 12.5 milliGRAM(s) Oral daily  hydrocortisone 1% Cream 1 Application(s) Topical four times a day  lactated ringers. 1000 milliLiter(s) (75 mL/Hr) IV Continuous <Continuous>  lidocaine   4% Patch 1 Patch Transdermal daily  lidocaine 2% Gel 1 Application(s) Topical four times a day  losartan 50 milliGRAM(s) Oral daily  polyethylene glycol 3350 17 Gram(s) Oral daily  senna 2 Tablet(s) Oral at bedtime    MEDICATIONS  (PRN):  benzocaine 15 mG/menthol 3.6 mG (Sugar-Free) Lozenge 1 Lozenge Oral four times a day PRN Sore Throat  ketorolac   Injectable 15 milliGRAM(s) IV Push every 6 hours PRN mild to moderate pain  traMADol 25 milliGRAM(s) Oral every 6 hours PRN Severe Pain (7 - 10)    Vital Signs Last 24 Hrs  T(C): 36.5 (05 Jan 2022 18:00), Max: 37.3 (05 Jan 2022 10:00)  T(F): 97.7 (05 Jan 2022 18:00), Max: 99.1 (05 Jan 2022 10:00)  HR: 85 (05 Jan 2022 18:00) (77 - 85)  BP: 91/57 (05 Jan 2022 18:00) (84/56 - 96/54)  BP(mean): --  RR: 18 (05 Jan 2022 18:00) (16 - 18)  SpO2: 100% (05 Jan 2022 18:00) (96% - 100%)  CAPILLARY BLOOD GLUCOSE    PHYSICAL EXAM:  GENERAL: NAD, well-developed  HEAD:  Atraumatic, Normocephalic  EYES: EOMI, conjunctiva and sclera clear  NECK: Supple, No JVD  CHEST/LUNG: Clear to auscultation bilaterally; No wheeze  HEART: Regular rate and rhythm; No murmurs, rubs, or gallops  ABDOMEN: Soft, Nontender, Nondistended; Bowel sounds present  EXTREMITIES:  2+ Peripheral Pulses, No clubbing, cyanosis, or edema  NEUROLOGY: non-focal  SKIN: No rashes or lesions    LABS: reviewed.                         8.8    7.72  )-----------( 175      ( 05 Jan 2022 13:13 )             27.1     01-05    138  |  105  |  13  ----------------------------<  99  3.8   |  22  |  0.86    Ca    8.0<L>      05 Jan 2022 05:30  Phos  3.6     01-05  Mg     1.80     01-05    EKG(personally reviewed):    RADIOLOGY & ADDITIONAL TESTS:    Imaging Personally Reviewed:    Consultant(s) Notes Reviewed:      Care Discussed with Consultants/Other Providers:   HPI: 71 yrs old male with h/o HTN, BPH, Gastric ulcer, elevated PSA and prostate cancer presents for preop to have single port robotic assisted radical prostatectomy, pelvic lymph node dissection. Medicine consulted for co-management and follow up post operatively. Patient seen and evaluated at bedside. NO acute distress evident, no overnight events. Comfortable appearing. Reports no sob, chest pain, abd pain, nausea, vomiting, dizziness, palpitations or further complaints.     PAST MEDICAL & SURGICAL HISTORY:  HTN (hypertension)    BPH with elevated PSA    Prostate cancer    Gastric ulcer    H/O inguinal hernia  left    History of partial gastrectomy  1988    Review of Systems:   CONSTITUTIONAL: No fever, weight loss, or fatigue  EYES: No eye pain, visual disturbances, or discharge  ENMT:  No difficulty hearing, tinnitus, vertigo; No sinus or throat pain  NECK: No pain or stiffness  RESPIRATORY: No cough, wheezing, chills or hemoptysis; No shortness of breath  CARDIOVASCULAR: No chest pain, palpitations, dizziness, or leg swelling  GASTROINTESTINAL: No abdominal or epigastric pain. No N/V/C/D/  GENITOURINARY: No dysuria, frequency, hematuria, or incontinence  NEUROLOGICAL: No headaches, memory loss, loss of strength, numbness, or tremors  SKIN: No itching, burning, rashes, or lesions   LYMPH NODES: No enlarged glands  ENDOCRINE: No heat or cold intolerance; No hair loss  MUSCULOSKELETAL: No joint pain or swelling; No muscle, back, or extremity pain    Allergies    No Known Allergies    Intolerances    Social History:     FAMILY HISTORY:    MEDICATIONS  (STANDING):  acetaminophen     Tablet .. 975 milliGRAM(s) Oral every 6 hours  heparin   Injectable 5000 Unit(s) SubCutaneous every 8 hours  hydrochlorothiazide 12.5 milliGRAM(s) Oral daily  hydrocortisone 1% Cream 1 Application(s) Topical four times a day  lactated ringers. 1000 milliLiter(s) (75 mL/Hr) IV Continuous <Continuous>  lidocaine   4% Patch 1 Patch Transdermal daily  lidocaine 2% Gel 1 Application(s) Topical four times a day  losartan 50 milliGRAM(s) Oral daily  polyethylene glycol 3350 17 Gram(s) Oral daily  senna 2 Tablet(s) Oral at bedtime    MEDICATIONS  (PRN):  benzocaine 15 mG/menthol 3.6 mG (Sugar-Free) Lozenge 1 Lozenge Oral four times a day PRN Sore Throat  ketorolac   Injectable 15 milliGRAM(s) IV Push every 6 hours PRN mild to moderate pain  traMADol 25 milliGRAM(s) Oral every 6 hours PRN Severe Pain (7 - 10)    Vital Signs Last 24 Hrs  T(C): 36.5 (05 Jan 2022 18:00), Max: 37.3 (05 Jan 2022 10:00)  T(F): 97.7 (05 Jan 2022 18:00), Max: 99.1 (05 Jan 2022 10:00)  HR: 85 (05 Jan 2022 18:00) (77 - 85)  BP: 91/57 (05 Jan 2022 18:00) (84/56 - 96/54)  BP(mean): --  RR: 18 (05 Jan 2022 18:00) (16 - 18)  SpO2: 100% (05 Jan 2022 18:00) (96% - 100%)  CAPILLARY BLOOD GLUCOSE    PHYSICAL EXAM:  GENERAL: NAD, well-developed, comfortable appearing.   HEAD:  Atraumatic, Normocephalic  EYES: EOMI, conjunctiva and sclera clear  NECK: Supple, No JVD  CHEST/LUNG: Clear to auscultation bilaterally; No wheeze  HEART: Regular rate and rhythm; No murmurs, rubs, or gallops  ABDOMEN: Soft, Nontender, Nondistended; Bowel sounds present  EXTREMITIES:  2+ Peripheral Pulses, No clubbing, cyanosis, or edema  NEUROLOGY: non-focal  SKIN: No rashes or lesions    LABS: reviewed.                         8.8    7.72  )-----------( 175      ( 05 Jan 2022 13:13 )             27.1     01-05    138  |  105  |  13  ----------------------------<  99  3.8   |  22  |  0.86    Ca    8.0<L>      05 Jan 2022 05:30  Phos  3.6     01-05  Mg     1.80     01-05    EKG(personally reviewed):    RADIOLOGY & ADDITIONAL TESTS:    Imaging Personally Reviewed:    Consultant(s) Notes Reviewed:      Care Discussed with Consultants/Other Providers:

## 2022-01-05 NOTE — CONSULT NOTE ADULT - ASSESSMENT
A 71 yrs old male with h/o HTN, BPH, Gastric ulcer, elevated PSA and prostate cancer presents for preop to have single port robotic assisted radical prostatectomy, pelvic lymph node dissection. Medicine consulted for co-management and follow up post operatively.     Prostate Cancer:   s/p Single port robotic assisted radial prostatectomy, pelvic LN dissection.   Continue post operative management as per primary team.   Pain control, bowel regimen, monitor bowel/urine output.   Follow up post operative labs, vitals.   Follow up surgical pathology.     Hypertension:     Gastric Ulcer:     Preventive Measures:   DVT ppx:   A 71 yrs old male with h/o HTN, BPH, Gastric ulcer, elevated PSA and prostate cancer presents for preop to have single port robotic assisted radical prostatectomy, pelvic lymph node dissection. Medicine consulted for co-management and follow up post operatively.     Prostate Cancer:   s/p Single port robotic assisted radial prostatectomy, pelvic LN dissection.   Continue post operative management as per primary team.   Pain control, bowel regimen, monitor bowel/urine output.   Follow up post operative labs, vitals.   Follow up surgical pathology.     Hypertension:   Soft BP trend, can hold anti-hypertensive agents.   Restart HCTZ,     Gastric Ulcer: History of ulcer, no recent bleeding.     Preventive Measures:   DVT ppx: Heparin SQ   Dispo: Pending hospital course.   A 71 yrs old male with h/o HTN, BPH, Gastric ulcer, elevated PSA and prostate cancer presents for preop to have single port robotic assisted radical prostatectomy, pelvic lymph node dissection. Medicine consulted for co-management and follow up post operatively.     Prostate Cancer:   s/p Single port robotic assisted radial prostatectomy, pelvic LN dissection.   Continue post operative management as per primary team.   Pain control, bowel regimen, monitor bowel/urine output.   Follow up post operative labs, vitals.   Follow up surgical pathology.     Hypertension:   Soft BP trend, can hold anti-hypertensive agents.     Gastric Ulcer: History of ulcer, no recent bleeding.     Preventive Measures:   DVT ppx: Heparin SQ   Dispo: Pending hospital course, DC planning per primary team.

## 2022-01-06 LAB
HCT VFR BLD CALC: 21.9 % — LOW (ref 39–50)
HGB BLD-MCNC: 7.3 G/DL — LOW (ref 13–17)
MCHC RBC-ENTMCNC: 30 PG — SIGNIFICANT CHANGE UP (ref 27–34)
MCHC RBC-ENTMCNC: 33.3 GM/DL — SIGNIFICANT CHANGE UP (ref 32–36)
MCV RBC AUTO: 90.1 FL — SIGNIFICANT CHANGE UP (ref 80–100)
NRBC # BLD: 0 /100 WBCS — SIGNIFICANT CHANGE UP
NRBC # FLD: 0 K/UL — SIGNIFICANT CHANGE UP
PLATELET # BLD AUTO: 152 K/UL — SIGNIFICANT CHANGE UP (ref 150–400)
RBC # BLD: 2.43 M/UL — LOW (ref 4.2–5.8)
RBC # FLD: 14.2 % — SIGNIFICANT CHANGE UP (ref 10.3–14.5)
WBC # BLD: 7.09 K/UL — SIGNIFICANT CHANGE UP (ref 3.8–10.5)
WBC # FLD AUTO: 7.09 K/UL — SIGNIFICANT CHANGE UP (ref 3.8–10.5)

## 2022-01-06 PROCEDURE — 99232 SBSQ HOSP IP/OBS MODERATE 35: CPT

## 2022-01-06 RX ADMIN — POLYETHYLENE GLYCOL 3350 17 GRAM(S): 17 POWDER, FOR SOLUTION ORAL at 12:31

## 2022-01-06 RX ADMIN — HEPARIN SODIUM 5000 UNIT(S): 5000 INJECTION INTRAVENOUS; SUBCUTANEOUS at 22:43

## 2022-01-06 RX ADMIN — LIDOCAINE 1 PATCH: 4 CREAM TOPICAL at 02:42

## 2022-01-06 RX ADMIN — Medication 975 MILLIGRAM(S): at 12:31

## 2022-01-06 RX ADMIN — Medication 1 APPLICATION(S): at 12:47

## 2022-01-06 RX ADMIN — HEPARIN SODIUM 5000 UNIT(S): 5000 INJECTION INTRAVENOUS; SUBCUTANEOUS at 06:56

## 2022-01-06 RX ADMIN — HEPARIN SODIUM 5000 UNIT(S): 5000 INJECTION INTRAVENOUS; SUBCUTANEOUS at 13:26

## 2022-01-06 RX ADMIN — LIDOCAINE 1 PATCH: 4 CREAM TOPICAL at 12:30

## 2022-01-06 NOTE — PROGRESS NOTE ADULT - ASSESSMENT
70 yo M s/p RALP/LND 1/4/2022, had asymptomatic hypotension postop, treated w/ NS IV bolus    1/5: pt offers no complaints, +BM, urine yellow, ONEAL SS, BP soft but pt asymptomatic, good UO, abdomen soft, nontender  1/6: diet advanced last evening, pain controlled, BP slightly better, abdomen soft, urine yellow, losartan and HCTZ d/c by hospitalist    Plan:  -f/u AM CBC  -orthostatic VS  -continue to hold BP meds  -ONEAL plan TBD  -continue reg diet, monitor GI function  -alves/leg bag teaching  -DVT prophy, IS, OOB, ambulate   70 yo M s/p RALP/LND 1/4/2022, had asymptomatic hypotension postop, treated w/ NS IV bolus    1/5: pt offers no complaints, +BM, urine yellow, ONEAL SS, BP soft but pt asymptomatic, good UO, abdomen soft, nontender  1/6: diet advanced last evening, pain controlled, BP slightly better, abdomen soft, urine yellow, losartan and HCTZ d/c by hospitalist    Plan:  -f/u AM CBC  -continue to hold BP meds  -ONEAL plan TBD  -continue reg diet, monitor GI function  -alves/leg bag teaching  -DVT prophy, IS, OOB, ambulate

## 2022-01-06 NOTE — PROGRESS NOTE ADULT - SUBJECTIVE AND OBJECTIVE BOX
Overnight events:  None    Subjective:  With  phone pt feels well, denies dizziness (had some yesterday, he thinks might be related to pain meds), tolerating diet, + GI function    Objective:    Vital signs  T(C): , Max: 37.3 (01-05-22 @ 10:00)  HR: 95 (01-06-22 @ 06:53)  BP: 106/58 (01-06-22 @ 06:53)  SpO2: 98% (01-06-22 @ 06:53)  Wt(kg): --    Output   Long: 1100  ONEAL: 68 sanguinous  01-05 @ 07:01  -  01-06 @ 07:00  --------------------------------------------------------  IN: 0 mL / OUT: 2153 mL / NET: -2153 mL        Gen: NAD  Abd: eliel c/d/i, soft, nontender, nondistended  : long secured    Labs: pending CBC                        8.8    7.72  )-----------( 175      ( 05 Jan 2022 13:13 )             27.1     05 Jan 2022 05:30    138    |  105    |  13     ----------------------------<  99     3.8     |  22     |  0.86     Ca    8.0        05 Jan 2022 05:30  Phos  3.6       05 Jan 2022 05:30  Mg     1.80      05 Jan 2022 05:30

## 2022-01-06 NOTE — PROGRESS NOTE ADULT - ASSESSMENT
A 71 yrs old male with h/o HTN, BPH, Gastric ulcer, elevated PSA and prostate cancer presents for preop to have single port robotic assisted radical prostatectomy, pelvic lymph node dissection. Medicine consulted for co-management and follow up post operatively.     Prostate Cancer:   s/p Single port robotic assisted radial prostatectomy, pelvic LN dissection.   Continue post operative management as per primary team.   Pain control, bowel regimen, monitor bowel/urine output.   Follow up post operative labs, vitals.   Follow up surgical pathology.     Anemia: Likely Post operative. Hb 7.3. Baseline 14 (2 weeks ago).   Receiving 1 Unit PRBC now, f/u post transfusion CBC.   Monitor for any worsening signs/ symptoms of bleeding/ instability.   Maintain active T&S, transfuse as needed.     Hypertension:   Soft BP trend, can hold anti-hypertensive agents.     Gastric Ulcer: History of ulcer, no recent bleeding.     Preventive Measures:   DVT ppx: Heparin SQ   Dispo: Pending hospital course, DC planning per primary team.

## 2022-01-06 NOTE — PROGRESS NOTE ADULT - ATTENDING COMMENTS
Hematocrit noted to be decreased this morning.  Will repeat.  The patient is not lightheaded or dizzy.  His blood pressure however is in the 90s whereas baseline is 110s.  Will potentially consider transfusion if become symptomatic.  Discharge plan pending repeat hematocrit.  Urine color is clear and the catheter is draining well.  Okay to advance diet.
post-op acute blood loss anemia   transfuse 2 units PRBC  repeat CBC post transfusion

## 2022-01-06 NOTE — PROGRESS NOTE ADULT - SUBJECTIVE AND OBJECTIVE BOX
PROGRESS NOTE:     Patient is a 71y old  Male who presents with a chief complaint of Single port robotic assisted radical prostatectomy, pelvic lymph node dissection. (05 Jan 2022 18:49)    SUBJECTIVE / OVERNIGHT EVENTS: Patient seen and evaluated at bedside. [facility]  # [id number]. NO acute distress evident, no overnight events. Comfortable appearing, saturating well on room air. Reports no sob, chest pain, abd pain, nausea, vomiting, dizziness, palpitations or further complaints.     ADDITIONAL REVIEW OF SYSTEMS:    MEDICATIONS  (STANDING):  acetaminophen     Tablet .. 975 milliGRAM(s) Oral every 6 hours  heparin   Injectable 5000 Unit(s) SubCutaneous every 8 hours  hydrocortisone 1% Cream 1 Application(s) Topical four times a day  lidocaine   4% Patch 1 Patch Transdermal daily  lidocaine 2% Gel 1 Application(s) Topical four times a day  polyethylene glycol 3350 17 Gram(s) Oral daily  senna 2 Tablet(s) Oral at bedtime    MEDICATIONS  (PRN):  benzocaine 15 mG/menthol 3.6 mG (Sugar-Free) Lozenge 1 Lozenge Oral four times a day PRN Sore Throat  ketorolac   Injectable 15 milliGRAM(s) IV Push every 6 hours PRN mild to moderate pain  traMADol 25 milliGRAM(s) Oral every 6 hours PRN Severe Pain (7 - 10)      CAPILLARY BLOOD GLUCOSE    I&O's Summary    05 Jan 2022 07:01  -  06 Jan 2022 07:00  --------------------------------------------------------  IN: 0 mL / OUT: 2153 mL / NET: -2153 mL    PHYSICAL EXAM:  Vital Signs Last 24 Hrs  T(C): 36.9 (06 Jan 2022 09:49), Max: 37 (06 Jan 2022 06:53)  T(F): 98.4 (06 Jan 2022 09:49), Max: 98.6 (06 Jan 2022 06:53)  HR: 105 (06 Jan 2022 09:49) (77 - 105)  BP: 104/60 (06 Jan 2022 09:49) (91/51 - 106/58)  BP(mean): --  RR: 18 (06 Jan 2022 09:49) (17 - 18)  SpO2: 97% (06 Jan 2022 09:49) (97% - 100%)    CONSTITUTIONAL: NAD, well-developed, comfortable appearing.   RESPIRATORY: Normal respiratory effort; lungs are clear to auscultation bilaterally  CARDIOVASCULAR: Regular rate and rhythm, normal S1 and S2   No lower extremity edema; Peripheral pulses are 2+ bilaterally  ABDOMEN: Nontender to palpation, normoactive bowel sounds.  MUSCLOSKELETAL: no clubbing or cyanosis of digits; no joint swelling or tenderness to palpation  PSYCH: A+O to person, place, and time; affect appropriate    LABS: reviewed.                         7.3    7.09  )-----------( 152      ( 06 Jan 2022 07:19 )             21.9     01-05    138  |  105  |  13  ----------------------------<  99  3.8   |  22  |  0.86    Ca    8.0<L>      05 Jan 2022 05:30  Phos  3.6     01-05  Mg     1.80     01-05    RADIOLOGY & ADDITIONAL TESTS:  Results Reviewed:   Imaging Personally Reviewed:  Electrocardiogram Personally Reviewed:    COORDINATION OF CARE:  Care Discussed with Consultants/Other Providers [Y/N]:  Prior or Outpatient Records Reviewed [Y/N]:

## 2022-01-07 ENCOUNTER — TRANSCRIPTION ENCOUNTER (OUTPATIENT)
Age: 72
End: 2022-01-07

## 2022-01-07 VITALS
TEMPERATURE: 98 F | RESPIRATION RATE: 18 BRPM | HEART RATE: 76 BPM | SYSTOLIC BLOOD PRESSURE: 118 MMHG | OXYGEN SATURATION: 96 % | DIASTOLIC BLOOD PRESSURE: 64 MMHG

## 2022-01-07 DIAGNOSIS — D64.9 ANEMIA, UNSPECIFIED: ICD-10-CM

## 2022-01-07 DIAGNOSIS — I10 ESSENTIAL (PRIMARY) HYPERTENSION: ICD-10-CM

## 2022-01-07 DIAGNOSIS — C61 MALIGNANT NEOPLASM OF PROSTATE: ICD-10-CM

## 2022-01-07 DIAGNOSIS — K25.9 GASTRIC ULCER, UNSPECIFIED AS ACUTE OR CHRONIC, WITHOUT HEMORRHAGE OR PERFORATION: ICD-10-CM

## 2022-01-07 DIAGNOSIS — Z29.9 ENCOUNTER FOR PROPHYLACTIC MEASURES, UNSPECIFIED: ICD-10-CM

## 2022-01-07 PROBLEM — N40.0 BENIGN PROSTATIC HYPERPLASIA WITHOUT LOWER URINARY TRACT SYMPTOMS: Chronic | Status: ACTIVE | Noted: 2021-12-22

## 2022-01-07 PROBLEM — Z87.19 PERSONAL HISTORY OF OTHER DISEASES OF THE DIGESTIVE SYSTEM: Chronic | Status: ACTIVE | Noted: 2021-12-22

## 2022-01-07 LAB
BLD GP AB SCN SERPL QL: NEGATIVE — SIGNIFICANT CHANGE UP
HCT VFR BLD CALC: 29.2 % — LOW (ref 39–50)
HGB BLD-MCNC: 9.8 G/DL — LOW (ref 13–17)
MCHC RBC-ENTMCNC: 29.2 PG — SIGNIFICANT CHANGE UP (ref 27–34)
MCHC RBC-ENTMCNC: 33.6 GM/DL — SIGNIFICANT CHANGE UP (ref 32–36)
MCV RBC AUTO: 86.9 FL — SIGNIFICANT CHANGE UP (ref 80–100)
NRBC # BLD: 0 /100 WBCS — SIGNIFICANT CHANGE UP
NRBC # FLD: 0 K/UL — SIGNIFICANT CHANGE UP
PLATELET # BLD AUTO: 166 K/UL — SIGNIFICANT CHANGE UP (ref 150–400)
RBC # BLD: 3.36 M/UL — LOW (ref 4.2–5.8)
RBC # FLD: 16 % — HIGH (ref 10.3–14.5)
RH IG SCN BLD-IMP: POSITIVE — SIGNIFICANT CHANGE UP
SURGICAL PATHOLOGY STUDY: SIGNIFICANT CHANGE UP
WBC # BLD: 7.08 K/UL — SIGNIFICANT CHANGE UP (ref 3.8–10.5)
WBC # FLD AUTO: 7.08 K/UL — SIGNIFICANT CHANGE UP (ref 3.8–10.5)

## 2022-01-07 PROCEDURE — 99232 SBSQ HOSP IP/OBS MODERATE 35: CPT

## 2022-01-07 RX ORDER — ACETAMINOPHEN 500 MG
3 TABLET ORAL
Qty: 0 | Refills: 0 | DISCHARGE
Start: 2022-01-07

## 2022-01-07 RX ORDER — LIDOCAINE 4 G/100G
1 CREAM TOPICAL
Qty: 15 | Refills: 0
Start: 2022-01-07

## 2022-01-07 RX ORDER — LOSARTAN/HYDROCHLOROTHIAZIDE 100MG-25MG
1 TABLET ORAL
Qty: 0 | Refills: 0 | DISCHARGE

## 2022-01-07 RX ORDER — POLYETHYLENE GLYCOL 3350 17 G/17G
17 POWDER, FOR SOLUTION ORAL
Qty: 0 | Refills: 0 | DISCHARGE
Start: 2022-01-07

## 2022-01-07 RX ORDER — SENNA PLUS 8.6 MG/1
2 TABLET ORAL
Qty: 0 | Refills: 0 | DISCHARGE
Start: 2022-01-07

## 2022-01-07 RX ORDER — MOXIFLOXACIN HYDROCHLORIDE TABLETS, 400 MG 400 MG/1
1 TABLET, FILM COATED ORAL
Qty: 10 | Refills: 0
Start: 2022-01-07 | End: 2022-01-11

## 2022-01-07 RX ORDER — HYDROCORTISONE 1 %
1 OINTMENT (GRAM) TOPICAL
Qty: 15 | Refills: 0
Start: 2022-01-07

## 2022-01-07 RX ADMIN — HEPARIN SODIUM 5000 UNIT(S): 5000 INJECTION INTRAVENOUS; SUBCUTANEOUS at 05:04

## 2022-01-07 RX ADMIN — LIDOCAINE 1 PATCH: 4 CREAM TOPICAL at 00:22

## 2022-01-07 NOTE — DISCHARGE NOTE NURSING/CASE MANAGEMENT/SOCIAL WORK - PATIENT PORTAL LINK FT
You can access the FollowMyHealth Patient Portal offered by [facility] by registering at the following website: [email or web address]. By joining [facility] [employer] [employer]’s FollowMyHealth portal, you will also be able to view your health information using other applications (apps) compatible with our system.

## 2022-01-07 NOTE — PROGRESS NOTE ADULT - SUBJECTIVE AND OBJECTIVE BOX
Overnight events:  None    Subjective:  Pt states he feels much better, tolerating diet, + GI function    Objective:    Vital signs  T(C): , Max: 37.6 (01-06-22 @ 19:14)  HR: 76 (01-07-22 @ 05:10)  BP: 118/64 (01-07-22 @ 05:10)  SpO2: 96% (01-07-22 @ 05:10)  Wt(kg): --    Output   Long: 1500 yellow  ONEAL: 37.5 SS  01-06 @ 07:01  -  01-07 @ 07:00  --------------------------------------------------------  IN: 0 mL / OUT: 1587.5 mL / NET: -1587.5 mL        Gen: NAD  Abd: incisions c/d/i, soft, nontender, nondistended  : long secured    Labs                        9.8    7.08  )-----------( 166      ( 07 Jan 2022 07:15 )             29.2              Overnight events:  None    Subjective:  Pt states he feels much better, tolerating diet, + GI function    Objective:    Vital signs  T(C): , Max: 37.6 (01-06-22 @ 19:14)  HR: 76 (01-07-22 @ 05:10)  BP: 118/64 (01-07-22 @ 05:10)  SpO2: 96% (01-07-22 @ 05:10)  Wt(kg): --    Output   Long: 1500 yellow  ONEAL: 37.5 SS  01-06 @ 07:01  -  01-07 @ 07:00  --------------------------------------------------------  IN: 0 mL / OUT: 1587.5 mL / NET: -1587.5 mL        Gen: NAD  Abd: incisions c/d/i, soft, nontender, nondistended, large nontender ecchymotic area left flank, no warmth or cellulitis  : long secured    Labs                        9.8    7.08  )-----------( 166      ( 07 Jan 2022 07:15 )             29.2

## 2022-01-07 NOTE — DISCHARGE NOTE NURSING/CASE MANAGEMENT/SOCIAL WORK - NSDCPNINST_GEN_ALL_CORE
Notify Dr Cruz if you experience  Notify Dr Cruz if you experience any increase in pain not relieved with medication, any redness drainage or swelling around incision or any fever >100.5.  dDink plenty of fluids.  No heavy lifting or straining.  [place] care as instructed, use leg bag during the day and large drainage bag at night.  Use over the counter stool softeners to assist with constipation which can be a side effect of narcotic pain medication.

## 2022-01-07 NOTE — DISCHARGE NOTE PROVIDER - NSDCMRMEDTOKEN_GEN_ALL_CORE_FT
acetaminophen 325 mg oral tablet: 3 tablets every 6 hours as needed for pain  polyethylene glycol 3350 oral powder for reconstitution: 17 gram(s) orally once a day  senna oral tablet: 2 tab(s) orally once a day (at bedtime)   acetaminophen 325 mg oral tablet: 3 tablets every 6 hours as needed for pain  Cipro 500 mg oral tablet: 1 tab(s) orally every 12 hours   hydrocortisone 1% topical cream: Apply a small amount to tip of penis 4 times a day as needed for catheter irritation  lidocaine 2% topical gel with applicator: Apply a small amount to tip of penis 4 times a day as needed for catheter irritation  polyethylene glycol 3350 oral powder for reconstitution: 17 gram(s) orally once a day  senna oral tablet: 2 tab(s) orally once a day (at bedtime)

## 2022-01-07 NOTE — PROGRESS NOTE ADULT - PROBLEM SELECTOR PLAN 5
DVT ppx: Heparin SQ   Dispo: Pending hospital course, DC planning per primary team. DVT ppx: Heparin SQ  Activity: as tolerated.    Dispo: Pending hospital course, DC planning per primary team.

## 2022-01-07 NOTE — PROGRESS NOTE ADULT - ASSESSMENT
70 yo M s/p RALP/LND 1/4/2022, had asymptomatic hypotension postop, treated w/ NS IV bolus    1/5: pt offers no complaints, +BM, urine yellow, ONEAL SS, BP soft but pt asymptomatic, good UO, abdomen soft, nontender  1/6: diet advanced last evening, pain controlled, BP slightly better, abdomen soft, urine yellow, losartan and HCTZ d/c by hospitalist  1/7: pt transfused 2u PRBC 1/6 for acute blood loss anemia with appropriate response, pt states he feels better, tolerating diet, + GI function    Plan:  -AM CBC reviewed  -continue to hold BP meds, restart as indicated  -ONEAL plan TBD  -continue reg diet, monitor GI function  -alves/leg bag teaching  -DVT prophy, IS, OOB, ambulate   70 yo M s/p RALP/LND 1/4/2022, had asymptomatic hypotension postop, treated w/ NS IV bolus    1/5: pt offers no complaints, +BM, urine yellow, ONEAL SS, BP soft but pt asymptomatic, good UO, abdomen soft, nontender  1/6: diet advanced last evening, pain controlled, BP slightly better, abdomen soft, urine yellow, losartan and HCTZ d/c by hospitalist  1/7: pt transfused 2u PRBC 1/6 for acute blood loss anemia with appropriate response, pt states he feels better, tolerating diet, + GI function    Plan:  -AM CBC reviewed  -continue to hold BP meds, restart as indicated  -d/c ONEAL  -continue reg diet, monitor GI function  -alves/leg bag teaching  -DVT prophy, IS, OOB, ambulate  -d/c home on cipro bid x 5 days

## 2022-01-07 NOTE — PROGRESS NOTE ADULT - SUBJECTIVE AND OBJECTIVE BOX
PROGRESS NOTE:     Patient is a 71y old  Male who presents with a chief complaint of Removal of prostate and lymph nodes (07 Jan 2022 09:30)    SUBJECTIVE / OVERNIGHT EVENTS: Patient seen and evaluated at bedside. NO acute distress evident, no overnight events. Comfortable appearing. Reports no sob, chest pain, abd pain, nausea, vomiting, dizziness, palpitations or further complaints.     ADDITIONAL REVIEW OF SYSTEMS:    MEDICATIONS  (STANDING):  acetaminophen     Tablet .. 975 milliGRAM(s) Oral every 6 hours  heparin   Injectable 5000 Unit(s) SubCutaneous every 8 hours  hydrocortisone 1% Cream 1 Application(s) Topical four times a day  lidocaine   4% Patch 1 Patch Transdermal daily  lidocaine 2% Gel 1 Application(s) Topical four times a day  polyethylene glycol 3350 17 Gram(s) Oral daily  senna 2 Tablet(s) Oral at bedtime    MEDICATIONS  (PRN):  benzocaine 15 mG/menthol 3.6 mG (Sugar-Free) Lozenge 1 Lozenge Oral four times a day PRN Sore Throat  ketorolac   Injectable 15 milliGRAM(s) IV Push every 6 hours PRN mild to moderate pain  traMADol 25 milliGRAM(s) Oral every 6 hours PRN Severe Pain (7 - 10)    CAPILLARY BLOOD GLUCOSE  I&O's Summary    06 Jan 2022 07:01  -  07 Jan 2022 07:00  --------------------------------------------------------  IN: 0 mL / OUT: 1587.5 mL / NET: -1587.5 mL    PHYSICAL EXAM:  Vital Signs Last 24 Hrs  T(C): 36.7 (07 Jan 2022 05:10), Max: 37.6 (06 Jan 2022 19:14)  T(F): 98.1 (07 Jan 2022 05:10), Max: 99.7 (06 Jan 2022 19:14)  HR: 76 (07 Jan 2022 05:10) (76 - 100)  BP: 118/64 (07 Jan 2022 05:10) (118/64 - 128/72)  BP(mean): --  RR: 18 (07 Jan 2022 05:10) (18 - 18)  SpO2: 96% (07 Jan 2022 05:10) (96% - 100%)    CONSTITUTIONAL: NAD, well-developed, comfortable appearing.   RESPIRATORY: Normal respiratory effort; lungs are clear to auscultation bilaterally  CARDIOVASCULAR: Regular rate and rhythm, normal S1 and S2  No lower extremity edema; Peripheral pulses are 2+ bilaterally  ABDOMEN: Nontender to palpation, normoactive bowel sounds.  MUSCLOSKELETAL: no clubbing or cyanosis of digits; no joint swelling or tenderness to palpation  PSYCH: A+O to person, place, and time; affect appropriate    LABS: none new to review.                         9.8    7.08  )-----------( 166      ( 07 Jan 2022 07:15 )             29.2     RADIOLOGY & ADDITIONAL TESTS:  Results Reviewed:   Imaging Personally Reviewed:  Electrocardiogram Personally Reviewed:    COORDINATION OF CARE:  Care Discussed with Consultants/Other Providers [Y/N]:  Prior or Outpatient Records Reviewed [Y/N]:

## 2022-01-07 NOTE — PROGRESS NOTE ADULT - PROBLEM SELECTOR PLAN 1
s/p Single port robotic assisted radial prostatectomy, pelvic LN dissection.   Continue post operative management as per primary team.   Pain control, bowel regimen, monitor bowel/urine output.   Follow up post operative labs, vitals.   Follow up surgical pathology.

## 2022-01-07 NOTE — DISCHARGE NOTE NURSING/CASE MANAGEMENT/SOCIAL WORK - NSDCPEFALRISK_GEN_ALL_CORE
For information on Fall & Injury Prevention, visit: [email or web address] OR  [email or web address] OR  https://www.cdc.gov/steadi/patient.html
unknown

## 2022-01-07 NOTE — PROGRESS NOTE ADULT - PROBLEM SELECTOR PLAN 2
Likely Post operative. Hb 7.3. Baseline 14 (2 weeks ago).   s/p 1 Unit PRBC now, f/u post transfusion CBC. Hb 9.8.  Monitor for any worsening signs/ symptoms of bleeding/ instability.   Maintain active T&S, transfuse as needed.

## 2022-01-07 NOTE — DISCHARGE NOTE PROVIDER - CARE PROVIDER_API CALL
Karlo Cruz)  Urology  [address], [address]  [place] [address]  Phone: [phone]  Fax: [phone]  Follow Up Time:     Luke Ya  INTERNAL MEDICINE  [address], [address] [place] [address]  Phone: [phone]  Fax: [phone]  Follow Up Time:

## 2022-01-07 NOTE — DISCHARGE NOTE PROVIDER - NSDCCPCAREPLAN_GEN_ALL_CORE_FT
PRINCIPAL DISCHARGE DIAGNOSIS  Diagnosis: BPH with elevated PSA  Assessment and Plan of Treatment: Empty alves bag as needed as instructed.  Keep well hydrated.  No heavy lifting or straining for 4 to 6 weeks, avoid constipation. You may have intermittent pink tinged urine and small amounts of leakage around alves (due to bladder spasms).  This is normal.   If your urine becomes bright red or with clots, or there is no urine in the bag, please call the office. You may shower, just pat white strips dry, they will fall off in a few weeks. Change dressing at drain site daily or as needed until dry.  You have some bruising in your left flank area, this will resolve over the next few weeks.  Dr. Cruz's office will call you to schedule a follow up appointment next week for alves removal and further management.  Call the office if you have fever greater than 101, no urine in bag, pain not relieved with tylenol, nausea/vomiting.        SECONDARY DISCHARGE DIAGNOSES  Diagnosis: HTN (hypertension)  Assessment and Plan of Treatment: Do not restart your blood pressure medication, your blood pressure was normal off this medicatio during your hospitalization.  Please follow up with Dr. Ya next week for blood pressure check and medication adjustment as indicated.

## 2022-01-07 NOTE — DISCHARGE NOTE PROVIDER - HOSPITAL COURSE
72 yo M s/p RALP/LND 1/4/2022, had asymptomatic hypotension postop, treated w/ NS IV bolus    1/5: pt offers no complaints, +BM, urine yellow, ONEAL SS, BP soft but pt asymptomatic, good UO, abdomen soft, nontender  1/6: diet advanced last evening, pain controlled, BP slightly better, abdomen soft, urine yellow, losartan and HCTZ d/c by hospitalist  1/7: pt transfused 2u PRBC 1/6 for acute blood loss anemia with appropriate response, pt states he feels better, tolerating diet, + GI function, ecchymotic area left flank, not tender or ecchymotic.  ONEAL d/c and pt d/c with alves to leg bag on cipro x 5 days to f/u with Dr. Cruz.

## 2022-01-07 NOTE — DISCHARGE NOTE NURSING/CASE MANAGEMENT/SOCIAL WORK - NSDPDISTO_GEN_ALL_CORE
"Encounter Date: 8/17/2020       History     Chief Complaint   Patient presents with    COVID-19 Concerns     Patient presents to ED secondary to HA, fever, generalized weakness, cough x2-3 days. Denies taking meds pta. Also endorses having a "stumble" and hitting right side of head on wall. Denies LOC.      Mr. Calles presents with two days of cough, shortness of breath, body aches, subjective fever, chills, decreased appetite, and fatigue.  He denies sick contacts and known exposure to COVID-19.  He has not taking any medications for his symptoms.  Earlier tonight, he became so weak while going to the bathroom that he fell and struck his head on a wall.  He denies loss of consciousness.  His cough is productive of dark color sputum.  He denies chest pain.  He has generalized abdominal pain, nausea, and diarrhea.    Documented history:  Back disorder with back pain.    The history is provided by the patient. No  was used.     Review of patient's allergies indicates:  No Known Allergies  Past Medical History:   Diagnosis Date    Back disorder with back pain      Past Surgical History:   Procedure Laterality Date    HERNIA REPAIR       History reviewed. No pertinent family history.  Social History     Tobacco Use    Smoking status: Current Every Day Smoker   Substance Use Topics    Alcohol use: No    Drug use: No     Review of Systems   Constitutional: Positive for appetite change, chills, fatigue and fever.   HENT: Negative for sore throat and trouble swallowing.    Eyes: Negative for visual disturbance.   Respiratory: Positive for cough and shortness of breath.    Cardiovascular: Negative for chest pain.   Gastrointestinal: Positive for abdominal pain, diarrhea and nausea. Negative for vomiting.   Endocrine: Negative for polydipsia and polyuria.   Genitourinary: Positive for dysuria. Negative for difficulty urinating.   Musculoskeletal: Positive for myalgias.   Skin: Negative for rash. "   Allergic/Immunologic: Negative for immunocompromised state.   Neurological: Positive for light-headedness and headaches. Negative for syncope, weakness and numbness.   Hematological: Does not bruise/bleed easily.       Physical Exam     Initial Vitals [08/17/20 0131]   BP Pulse Resp Temp SpO2   135/85 (!) 117 20 99.8 °F (37.7 °C) 95 %      MAP       --         Physical Exam    Nursing note and vitals reviewed.  Constitutional: He appears well-developed and well-nourished. He is not diaphoretic. No distress.   HENT:   Head: Normocephalic and atraumatic.   Mouth/Throat: Oropharynx is clear and moist.   Eyes: Conjunctivae are normal. No scleral icterus.   Neck: No JVD present.   Cardiovascular: Regular rhythm and normal heart sounds. Tachycardia present.  Exam reveals no gallop and no friction rub.    No murmur heard.  Pulses:       Radial pulses are 2+ on the right side and 2+ on the left side.        Dorsalis pedis pulses are 2+ on the right side and 2+ on the left side.   Pulmonary/Chest: Effort normal and breath sounds normal. No stridor. No respiratory distress. He has no decreased breath sounds. He has no wheezes. He has no rhonchi. He has no rales.   Abdominal: Soft. He exhibits no distension. There is no abdominal tenderness.   Musculoskeletal: Normal range of motion. No tenderness or edema.   Neurological: He is alert and oriented to person, place, and time. GCS score is 15. GCS eye subscore is 4. GCS verbal subscore is 5. GCS motor subscore is 6.   Skin: Skin is warm and dry. No pallor.         ED Course   Procedures  Labs Reviewed   CBC W/ AUTO DIFFERENTIAL - Abnormal; Notable for the following components:       Result Value    Mono # 1.8 (*)     Mono% 17.4 (*)     All other components within normal limits   COMPREHENSIVE METABOLIC PANEL - Abnormal; Notable for the following components:    Total Protein 8.6 (*)     Total Bilirubin 1.2 (*)     AST 41 (*)     All other components within normal limits    URINALYSIS, REFLEX TO URINE CULTURE - Abnormal; Notable for the following components:    Color, UA Orange (*)     Specific Gravity, UA >=1.030 (*)     Occult Blood UA Trace (*)     All other components within normal limits    Narrative:     Specimen Source->Urine   C. TRACHOMATIS/N. GONORRHOEAE BY AMP DNA   SARS-COV-2 RNA AMPLIFICATION, QUAL    Narrative:     Is this needed for pre-procedure or pre-op testing?->No     EKG Readings: (Independently Interpreted)   08/17/2020 02:17  Sinus tachycardia.  Ventricular rate 102 beats per minute.  Normal axis.  Normal QRS and QT intervals.  No ST segment elevation or depression.  Normal T-wave morphology.  Overall impression - sinus tachycardia, otherwise normal EKG.       Imaging Results          X-Ray Chest AP Portable (Final result)  Result time 08/17/20 02:30:54    Final result by Nallely Dupont MD (08/17/20 02:30:54)                 Impression:      No acute intrathoracic abnormality identified on this single radiographic view of the chest.      Electronically signed by: Nallely Dupont MD  Date:    08/17/2020  Time:    02:30             Narrative:    EXAMINATION:  XR CHEST AP PORTABLE    CLINICAL HISTORY:  Suspected Covid-19 Virus Infection;    TECHNIQUE:  Single frontal view of the chest was performed.    COMPARISON:  None    FINDINGS:  Cardiac monitoring leads overlie the chest.  Cardiomediastinal silhouette is within normal limits.  The visualized airway is unremarkable.  The lungs appear symmetrically aerated without confluent airspace consolidation.  No large pleural effusion or pneumothorax is appreciated.Visualized osseous structures are intact.                                 Medical Decision Making:   History:   Old Medical Records: I decided to obtain old medical records.  Old Records Summarized: other records.       <> Summary of Records: Reviewed past medical history, see HPI.  Independently Interpreted Test(s):   I have ordered and independently interpreted  EKG Reading(s) - see prior notes  Clinical Tests:   Lab Tests: Ordered and Reviewed  Radiological Study: Ordered and Reviewed  Medical Tests: Ordered and Reviewed    Medical decision making:  This patient underwent evaluation for a multitude of complaints.  Primarily, he complained of two days of fatigue, cough, shortness of breath, body aches.  He was afebrile on arrival but tachycardic.  Differential diagnoses included COVID-19 virus infection, pneumonia, urinary tract infection, and other infectious processes.  He was evaluated with cardiopulmonary monitoring, serial exams, EKG, labs, and chest x-ray.  Rapid COVID test was performed and is negative.  Chest x-ray is negative for acute processes.  He has no white count elevation, electrolyte anomalies, or renal insufficiency.  Urinalysis results not consistent with UTI.  He complains of persistent dysuria and penile discharge for two weeks and is concerned that he might have an STD.  Urine GC and chlamydia are pending.  He is being empirically treated with IM Rocephin and oral azithromycin.                   ED Course as of Aug 17 0433   Mon Aug 17, 2020   0428 The patient is now awake and alert. He is now complaining of persistent burning with urination for two weeks. He also has persistent gray colored penile discharge that began just before the burning. He thinks that he may have an STD because he slept with someone new.    [LP]   0432 SARS-CoV-2 RNA, Amplification, Qual: Negative [LP]   0433 WBC: 10.36 [LP]   0433 Hemoglobin: 15.6 [LP]   0433 Hematocrit: 47.1 [LP]   0433 Platelets: 247 [LP]   0433 Color, UA(!): Orange [LP]   0433 Specific Gravity, UA(!): >=1.030 [LP]   0433 Occult Blood UA(!): Trace [LP]   0433 Leukocytes, UA: Negative [LP]   0433 Sodium: 136 [LP]   0433 Potassium: 4.1 [LP]   0433 BUN, Bld: 13 [LP]   0433 Creatinine: 1.1 [LP]      ED Course User Index  [LP] Tobias Moura III, MD                Clinical Impression:       ICD-10-CM ICD-9-CM   1.  Cough  R05 786.2   2. Shortness of breath  R06.02 786.05   3. Fatigue, unspecified type  R53.83 780.79   4. Dysuria  R30.0 788.1   5. Penile discharge  R36.9 788.7   6. Urethritis  N34.2 597.80         Disposition:   Disposition: Discharged  Condition: Stable                        Tobias Moura III, MD  08/17/20 0435     stable, abdominal steri strips NAYA clean dry and intact, tolerating diet, oob, alves draining adequately/Home

## 2022-01-11 ENCOUNTER — APPOINTMENT (OUTPATIENT)
Dept: UROLOGY | Facility: CLINIC | Age: 72
End: 2022-01-11
Payer: MEDICARE

## 2022-01-11 ENCOUNTER — EMERGENCY (EMERGENCY)
Facility: HOSPITAL | Age: 72
LOS: 1 days | Discharge: HOME | End: 2022-01-11
Attending: EMERGENCY MEDICINE | Admitting: EMERGENCY MEDICINE
Payer: MEDICARE

## 2022-01-11 VITALS
OXYGEN SATURATION: 100 % | SYSTOLIC BLOOD PRESSURE: 142 MMHG | RESPIRATION RATE: 20 BRPM | TEMPERATURE: 98 F | HEART RATE: 82 BPM | HEIGHT: 65 IN | DIASTOLIC BLOOD PRESSURE: 80 MMHG

## 2022-01-11 VITALS
SYSTOLIC BLOOD PRESSURE: 119 MMHG | HEIGHT: 68 IN | HEART RATE: 78 BPM | OXYGEN SATURATION: 99 % | WEIGHT: 120 LBS | TEMPERATURE: 97.6 F | BODY MASS INDEX: 18.19 KG/M2 | DIASTOLIC BLOOD PRESSURE: 70 MMHG | RESPIRATION RATE: 17 BRPM

## 2022-01-11 VITALS
RESPIRATION RATE: 18 BRPM | DIASTOLIC BLOOD PRESSURE: 64 MMHG | SYSTOLIC BLOOD PRESSURE: 121 MMHG | TEMPERATURE: 98 F | HEART RATE: 80 BPM | OXYGEN SATURATION: 100 %

## 2022-01-11 DIAGNOSIS — N40.1 BENIGN PROSTATIC HYPERPLASIA WITH LOWER URINARY TRACT SYMPMS: ICD-10-CM

## 2022-01-11 DIAGNOSIS — Z90.79 ACQUIRED ABSENCE OF OTHER GENITAL ORGAN(S): Chronic | ICD-10-CM

## 2022-01-11 DIAGNOSIS — C61 MALIGNANT NEOPLASM OF PROSTATE: ICD-10-CM

## 2022-01-11 DIAGNOSIS — N13.8 BENIGN PROSTATIC HYPERPLASIA WITH LOWER URINARY TRACT SYMPMS: ICD-10-CM

## 2022-01-11 DIAGNOSIS — N39.3 STRESS INCONTINENCE (FEMALE) (MALE): ICD-10-CM

## 2022-01-11 DIAGNOSIS — Z90.3 ACQUIRED ABSENCE OF STOMACH [PART OF]: Chronic | ICD-10-CM

## 2022-01-11 PROCEDURE — 99024 POSTOP FOLLOW-UP VISIT: CPT

## 2022-01-11 PROCEDURE — 99284 EMERGENCY DEPT VISIT MOD MDM: CPT

## 2022-01-11 NOTE — ED ADULT TRIAGE NOTE - CHIEF COMPLAINT QUOTE
Pt had alves catheter removed today at 130 pm for bladder cancer. Pt unable to void. Mandarin speaking.

## 2022-01-11 NOTE — ED PROVIDER NOTE - PLAN OF CARE
Benito, PGY-1: 71M s/p recent radical prostatectomy p/w actute urinary retention and suprapubic discomfort since removal of post-op alves this afternoon, w/ 1L volume on U/S Bladder. Afebrile, no CVAT. Uro consulted, plan to replace alves cath.

## 2022-01-11 NOTE — ED PROVIDER NOTE - OBJECTIVE STATEMENT
71M pmh HTN, gastric ulcer and prostate cancer s/p recent radical prostatectomy p/w urinary retention and suprapubic discomfort since removal of alves catheter this afternoon at outpatient urology office. Patient had Jan-04 radical prostatectomy by Dr. Cruz with hospital course c/b asymptomatic post-op hypotension and ABL anemia requiring 2U pRBC, discharged on 01/07 w/ alves-to-leg bag and 5-day Cipro course. Reports 1:30pm outpt urology appt today for alves removal - since then has had repeat urge to void with no urine output, a/w suprapubic discomfort. Last BM yesterday. Denies f/chills, flank pain, n/v/d, CP/palpitations. 71M pmh HTN, gastric ulcer and prostate cancer s/p recent radical prostatectomy p/w urinary retention and suprapubic discomfort since removal of alves catheter this afternoon at outpatient urology office. Patient had  radical prostatectomy by Dr. Cruz with hospital course c/b asymptomatic post-op hypotension and ABL anemia requiring 2U pRBC, discharged on  w/ alves-to-leg bag and 5-day Cipro course. Reports 1:30pm outpt urology appt today for alves removal - since then has had repeat urge to void with no urine output, a/w suprapubic discomfort. Last BM yesterday. Denies f/chills, flank pain, n/v/d, CP/palpitations.    Attendinyo male presents with inability to urinate for 4 hours.  had alves removed and has been unable to pee since then.  no bloody urine.  no nausea or vomiting.  no fever

## 2022-01-11 NOTE — PROCEDURE NOTE - ADDITIONAL PROCEDURE DETAILS
Patient is s/p prostatectomy on 1/4 went for alves removal around 1PM, but has been unable to void. Bladder volume in ER was 700ml. An 18 FR coude catheter was easily inserted, no resistance, 10 cc in balloon.  Patient can go home with alves to leg bag. Son will call the office tomorrow for appointment for trial of void.

## 2022-01-11 NOTE — PROCEDURE NOTE - NSINFORMCONSENT_GEN_A_CORE
Face to face report given with opportunity to observe patient.    Report given to VESTA Mariano   3/15/2017  7:06 PM       Son translates/Benefits, risks, and possible complications of procedure explained to patient/caregiver who verbalized understanding and gave verbal consent.

## 2022-01-11 NOTE — ED PROVIDER NOTE - CARE PLAN
Assessment and plan of treatment:	Benito, PGY-1: 71M s/p recent radical prostatectomy p/w actute urinary retention and suprapubic discomfort since removal of post-op alves this afternoon, w/ 1L volume on U/S Bladder. Afebrile, no CVAT. Uro consulted, plan to replace alves cath.   1 Principal Discharge DX:	Urinary retention

## 2022-01-11 NOTE — ED PROVIDER NOTE - PATIENT PORTAL LINK FT
You can access the FollowMyHealth Patient Portal offered by [facility] by registering at the following website: [email or web address]. By joining [facility]’s FollowMyHealth portal, you will also be able to view your health information using other applications (apps) compatible with our system.

## 2022-01-11 NOTE — ED PROVIDER NOTE - PHYSICAL EXAMINATION
GENERAL: NAD, lying in bed comfortably  HEAD:  Atraumatic, Normocephalic  EYES: EOMI, PERRLA, conjunctiva and sclera clear  ENT: Moist mucous membranes  NECK: Supple, No JVD  CHEST/LUNG: Clear to auscultation bilaterally; No rales, rhonchi, wheezing, or rubs. Unlabored respirations  HEART: Regular rate and rhythm; No murmurs, rubs, or gallops  ABDOMEN: Bowel sounds present; Soft, Nontender, Nondistended. No hepatomegally  : suprapubic fullness and discomfort; no CVAT  EXTREMITIES:  2+ Peripheral Pulses, brisk capillary refill. No clubbing, cyanosis, or edema  NERVOUS SYSTEM:  Alert & Oriented X3, speech clear. No deficits   MSK: FROM all 4 extremities, full and equal strength  SKIN: No rashes or lesions

## 2022-01-11 NOTE — ED PROVIDER NOTE - NSICDXPASTSURGICALHX_GEN_ALL_CORE_FT
PAST SURGICAL HISTORY:  H/O radical prostatectomy 01/04/2022    History of partial gastrectomy 1988

## 2022-01-11 NOTE — ED ADULT NURSE NOTE - OBJECTIVE STATEMENT
Pt AOX3 came in with his son , son states my dad has urinary retention and his Mccracken was removed this afternoon at doctor's office and now he can urinate. as per son patient had prostate removed 1 week ago  and sent home with Mccracken. Patient appears very uncomfortable and c/o urge to urinate. MD made aware. awaiting  t be seen by MD.

## 2022-01-11 NOTE — ED PROVIDER NOTE - CARE PROVIDER_API CALL
Karlo Cruz)  Urology  [address], [address]  [place] [address]  Phone: [phone]  Fax: [phone]  Follow Up Time: Urgent

## 2022-01-13 PROBLEM — R33.9 URINARY RETENTION: Status: ACTIVE | Noted: 2022-01-13

## 2022-01-14 ENCOUNTER — APPOINTMENT (OUTPATIENT)
Dept: UROLOGY | Facility: CLINIC | Age: 72
End: 2022-01-14
Payer: MEDICARE

## 2022-01-14 ENCOUNTER — OUTPATIENT (OUTPATIENT)
Dept: OUTPATIENT SERVICES | Facility: HOSPITAL | Age: 72
LOS: 1 days | End: 2022-01-14
Payer: COMMERCIAL

## 2022-01-14 VITALS
BODY MASS INDEX: 18.19 KG/M2 | DIASTOLIC BLOOD PRESSURE: 67 MMHG | RESPIRATION RATE: 17 BRPM | SYSTOLIC BLOOD PRESSURE: 130 MMHG | TEMPERATURE: 98 F | HEART RATE: 76 BPM | WEIGHT: 120 LBS | HEIGHT: 68 IN

## 2022-01-14 DIAGNOSIS — R33.9 RETENTION OF URINE, UNSPECIFIED: ICD-10-CM

## 2022-01-14 DIAGNOSIS — Z90.3 ACQUIRED ABSENCE OF STOMACH [PART OF]: Chronic | ICD-10-CM

## 2022-01-14 DIAGNOSIS — Z90.79 ACQUIRED ABSENCE OF OTHER GENITAL ORGAN(S): Chronic | ICD-10-CM

## 2022-01-14 DIAGNOSIS — C61 MALIGNANT NEOPLASM OF PROSTATE: ICD-10-CM

## 2022-01-14 PROCEDURE — 51700 IRRIGATION OF BLADDER: CPT | Mod: 58

## 2022-01-14 PROCEDURE — 51700 IRRIGATION OF BLADDER: CPT

## 2022-02-22 ENCOUNTER — APPOINTMENT (OUTPATIENT)
Dept: UROLOGY | Facility: CLINIC | Age: 72
End: 2022-02-22
Payer: MEDICARE

## 2022-02-22 PROCEDURE — 99024 POSTOP FOLLOW-UP VISIT: CPT

## 2022-02-23 ENCOUNTER — NON-APPOINTMENT (OUTPATIENT)
Age: 72
End: 2022-02-23

## 2022-02-23 LAB
PSA FREE FLD-MCNC: 9 %
PSA FREE SERPL-MCNC: 0.02 NG/ML
PSA SERPL-MCNC: 0.22 NG/ML

## 2022-06-07 ENCOUNTER — APPOINTMENT (OUTPATIENT)
Dept: UROLOGY | Facility: CLINIC | Age: 72
End: 2022-06-07
Payer: MEDICARE

## 2022-06-07 VITALS
HEIGHT: 68 IN | DIASTOLIC BLOOD PRESSURE: 81 MMHG | RESPIRATION RATE: 17 BRPM | WEIGHT: 120 LBS | BODY MASS INDEX: 18.19 KG/M2 | HEART RATE: 79 BPM | TEMPERATURE: 98.4 F | SYSTOLIC BLOOD PRESSURE: 146 MMHG

## 2022-06-07 PROCEDURE — 99214 OFFICE O/P EST MOD 30 MIN: CPT

## 2022-06-08 LAB — PSA SERPL-MCNC: 0.26 NG/ML

## 2022-06-17 ENCOUNTER — OUTPATIENT (OUTPATIENT)
Dept: OUTPATIENT SERVICES | Facility: HOSPITAL | Age: 72
LOS: 1 days | Discharge: HOME | End: 2022-06-17
Payer: MEDICARE

## 2022-06-17 DIAGNOSIS — Z90.3 ACQUIRED ABSENCE OF STOMACH [PART OF]: Chronic | ICD-10-CM

## 2022-06-17 DIAGNOSIS — Z90.79 ACQUIRED ABSENCE OF OTHER GENITAL ORGAN(S): Chronic | ICD-10-CM

## 2022-06-20 ENCOUNTER — APPOINTMENT (OUTPATIENT)
Dept: RADIATION ONCOLOGY | Facility: CLINIC | Age: 72
End: 2022-06-20
Payer: MEDICARE

## 2022-06-20 VITALS
WEIGHT: 122.69 LBS | OXYGEN SATURATION: 98 % | RESPIRATION RATE: 18 BRPM | HEART RATE: 71 BPM | SYSTOLIC BLOOD PRESSURE: 149 MMHG | HEIGHT: 68 IN | DIASTOLIC BLOOD PRESSURE: 85 MMHG | BODY MASS INDEX: 18.59 KG/M2 | TEMPERATURE: 96.98 F

## 2022-06-20 PROCEDURE — 99205 OFFICE O/P NEW HI 60 MIN: CPT | Mod: 25

## 2022-06-20 PROCEDURE — 77263 THER RADIOLOGY TX PLNG CPLX: CPT

## 2022-06-20 NOTE — REVIEW OF SYSTEMS
[Nocturia] : nocturia [IPSS Score (0-40): ___] : IPSS score: [unfilled] [EPIC-CP Score (0-60): ___] : EPIC-CP score: [unfilled] [Negative] : Psychiatric [Hematuria: Grade 0] : Hematuria: Grade 0 [Urinary Incontinence: Grade 1 - Occasional (e.g., with coughing, sneezing, etc.), pads not indicated] : Urinary Incontinence: Grade 1 - Occasional (e.g., with coughing, sneezing, etc.), pads not indicated [Urinary Retention: Grade 0] : Urinary Retention: Grade 0 [Urinary Tract Pain: Grade 0] : Urinary Tract Pain: Grade 0 [Urinary Urgency: Grade 0] : Urinary Urgency: Grade 0 [Urinary Frequency: Grade 0] : Urinary Frequency: Grade 0 [Ejaculation Disorder: Grade 1 - Diminished ejaculation] : Ejaculation Disorder: Grade 1 - Diminished ejaculation  [Erectile Dysfunction: Grade 1 - Decrease in erectile function (frequency or rigidity of erections) but intervention not indicated (e.g., medication or use of mechanical device, penile pump)] : Erectile Dysfunction: Grade 1 - Decrease in erectile function (frequency or rigidity of erections) but intervention not indicated (e.g., medication or use of mechanical device, penile pump) [Urinary Frequency] : no urinary frequency [FreeTextEntry2] : occasional dribbling

## 2022-06-20 NOTE — DISEASE MANAGEMENT
[0-10] : 0 -10 ng/mL [DAPHNEY] : DAPHNEY [Radical Prostatectomy] : Radical Prostatectomy [Radiation Therapy] : Radiation Therapy [Patient had a radical prostatectomy] : Patient had a radical prostatectomy  [7(4+3)] : Jason Score 7(4+3) [Negative] : Negative margins [c] : c [1] : N1 [0] : M0 [RadicalProstatectomyDate] : 01/22 [TotalNumberofnodesresected] : 12 [PositiveNodes] : 2

## 2022-06-20 NOTE — HISTORY OF PRESENT ILLNESS
[FreeTextEntry1] : Mr. Campbell is a 71 years old male with s/p radical prostatectomy and bilateral pelvic lymph nodes dissection on 1/4/22. Surgical pathology report indicated acinar adenocarcinoma of the prostate with Jason 4+3=7. EPE  and SVI was present. Bladder neck invasion not identified. Margins was uninvolved by invasive carcinoma. 2 out of 12 lymph nodes was involved by invasive carcinoma. Extranodal extension was not identified. Size of largest metastatic deposit was 0.4 cm. Lymphovascular invasion was present. Pathologic staging : [id number].\par \par Patient with rising PSA post RP.\par \par PSA\par 9/24/21   -  9.25\par 2/22/22   -  0.22 - post sx.\par 6/7/22     -  0.26\par \par Patient follow up with Dr. Cruz on 6/7/22 when it was decided that he would be a good candidate for pelvic radiation due to his higher PSA. \par \par He presents today to discuss his radiation therapy option. He report one time nocturia per night and occasional dribbling with urge to urinate. He denies dysuria, hematuria, hesitancy, frequency and hematuria. Patient has regular daily bowel function and he is not sexually active.

## 2022-06-24 ENCOUNTER — NON-APPOINTMENT (OUTPATIENT)
Age: 72
End: 2022-06-24

## 2022-06-24 PROCEDURE — 77333 RADIATION TREATMENT AID(S): CPT | Mod: 26

## 2022-07-01 PROCEDURE — 77300 RADIATION THERAPY DOSE PLAN: CPT | Mod: 26

## 2022-07-01 PROCEDURE — 77338 DESIGN MLC DEVICE FOR IMRT: CPT | Mod: 26

## 2022-07-01 PROCEDURE — 77301 RADIOTHERAPY DOSE PLAN IMRT: CPT | Mod: 26

## 2022-07-11 ENCOUNTER — NON-APPOINTMENT (OUTPATIENT)
Age: 72
End: 2022-07-11

## 2022-07-11 NOTE — HISTORY OF PRESENT ILLNESS
[FreeTextEntry1] : This is a 71-year-old diagnosed with carcinoma the prostate, Jason 7 status post radical prostatectomy in January 2022. Surgical pathology identified a 4+3 cancer with extraprostatic extension and seminal vesicle invasion present. Margins were clear and 2 of 12 lymph nodes were positive. \par Patient presents for [facility]  today.  radiation treatment. He denies any urinary or bowel issues. He will start his radiation treatment tomorrow.

## 2022-07-12 PROCEDURE — 77387B: CUSTOM | Mod: 26

## 2022-07-13 PROCEDURE — 77387B: CUSTOM | Mod: 26

## 2022-07-14 PROCEDURE — 77387B: CUSTOM | Mod: 26

## 2022-07-15 PROCEDURE — 77387B: CUSTOM | Mod: 26

## 2022-07-18 ENCOUNTER — NON-APPOINTMENT (OUTPATIENT)
Age: 72
End: 2022-07-18

## 2022-07-18 PROCEDURE — 77387B: CUSTOM | Mod: 26

## 2022-07-18 PROCEDURE — 77427 RADIATION TX MANAGEMENT X5: CPT

## 2022-07-19 PROCEDURE — 77387B: CUSTOM | Mod: 26

## 2022-07-20 NOTE — HISTORY OF PRESENT ILLNESS
[FreeTextEntry1] : This is a 71-year-old diagnosed with carcinoma the prostate, Jason 7 status post radical prostatectomy in January 2022. Surgical pathology identified a 4+3 cancer with extraprostatic extension and seminal vesicle invasion present. Margins were clear and 2 of 12 lymph nodes were positive. \par Patient presents for  radiation treatment. He denies any urinary or bowel issues. He completed 1250/6255 gy.

## 2022-07-21 PROCEDURE — 77387B: CUSTOM | Mod: 26

## 2022-07-22 PROCEDURE — 77387B: CUSTOM | Mod: 26

## 2022-07-25 ENCOUNTER — NON-APPOINTMENT (OUTPATIENT)
Age: 72
End: 2022-07-25

## 2022-07-25 PROCEDURE — 77387B: CUSTOM | Mod: 26

## 2022-07-25 NOTE — HISTORY OF PRESENT ILLNESS
[FreeTextEntry1] : This is a 71-year-old diagnosed with carcinoma the prostate, Jason 7 status post radical prostatectomy in January 2022. Surgical pathology identified a 4+3 cancer with extraprostatic extension and seminal vesicle invasion present. Margins were clear and 2 of 12 lymph nodes were positive. \par Patient presents for  radiation treatment. He denies any urinary bother but report loose BM. He completed 2250/6255 gy.

## 2022-07-26 PROCEDURE — 77427 RADIATION TX MANAGEMENT X5: CPT

## 2022-07-26 PROCEDURE — 77387B: CUSTOM | Mod: 26

## 2022-07-27 PROCEDURE — 77387B: CUSTOM | Mod: 26

## 2022-07-28 PROCEDURE — 77387B: CUSTOM | Mod: 26

## 2022-07-29 PROCEDURE — 77387B: CUSTOM | Mod: 26

## 2022-08-01 ENCOUNTER — NON-APPOINTMENT (OUTPATIENT)
Age: 72
End: 2022-08-01

## 2022-08-01 PROCEDURE — 77387B: CUSTOM | Mod: 26

## 2022-08-01 NOTE — HISTORY OF PRESENT ILLNESS
[FreeTextEntry1] : This is a 71-year-old diagnosed with carcinoma the prostate, Jason 7 status post radical prostatectomy in January 2022. Surgical pathology identified a 4+3 cancer with extraprostatic extension and seminal vesicle invasion present. Margins were clear and 2 of 12 lymph nodes were positive. \par Patient presents for  radiation treatment. He denies any urinary bother  He report his bowel issues had improved. He completed 3500/6255 Gy.

## 2022-08-02 PROCEDURE — 77427 RADIATION TX MANAGEMENT X5: CPT

## 2022-08-02 PROCEDURE — 77387B: CUSTOM | Mod: 26

## 2022-08-03 PROCEDURE — 77387B: CUSTOM | Mod: 26

## 2022-08-04 PROCEDURE — 77387B: CUSTOM | Mod: 26

## 2022-08-04 NOTE — PROGRESS NOTE ADULT - PROBLEM/PLAN-1
DISPLAY PLAN FREE TEXT Klisyri Pregnancy And Lactation Text: It is unknown if this medication can harm a developing fetus or if it is excreted in breast milk.

## 2022-08-05 PROCEDURE — 77387B: CUSTOM | Mod: 26

## 2022-08-08 ENCOUNTER — NON-APPOINTMENT (OUTPATIENT)
Age: 72
End: 2022-08-08

## 2022-08-08 PROCEDURE — 77387B: CUSTOM | Mod: 26

## 2022-08-08 NOTE — HISTORY OF PRESENT ILLNESS
[FreeTextEntry1] : This is a 71-year-old diagnosed with carcinoma the prostate, Jason 7 status post radical prostatectomy in January 2022. Surgical pathology identified a 4+3 cancer with extraprostatic extension and seminal vesicle invasion present. Margins were clear and 2 of 12 lymph nodes were positive. \par Patient presents for  radiation treatment. He denies any urinary bother  He report his bowel issues had improved. He completed 4750/6255 Gy.

## 2022-08-09 PROCEDURE — 77387B: CUSTOM | Mod: 26

## 2022-08-09 PROCEDURE — 77427 RADIATION TX MANAGEMENT X5: CPT

## 2022-08-10 PROCEDURE — 77387B: CUSTOM | Mod: 26

## 2022-08-11 PROCEDURE — 77387B: CUSTOM | Mod: 26

## 2022-08-12 PROCEDURE — 77387B: CUSTOM | Mod: 26

## 2022-08-15 ENCOUNTER — NON-APPOINTMENT (OUTPATIENT)
Age: 72
End: 2022-08-15

## 2022-08-15 PROCEDURE — 77387B: CUSTOM | Mod: 26

## 2022-08-15 PROCEDURE — 77427 RADIATION TX MANAGEMENT X5: CPT

## 2022-08-15 NOTE — HISTORY OF PRESENT ILLNESS
[FreeTextEntry1] : This is a 71-year-old diagnosed with carcinoma the prostate, Jason 7 status post radical prostatectomy in January 2022. Surgical pathology identified a 4+3 cancer with extraprostatic extension and seminal vesicle invasion present. Margins were clear and 2 of 12 lymph nodes were positive. \par Patient presents for  radiation treatment. He denies any urinary bother  He report his bowel issues had improved. He completed 6000/6255 Gy.

## 2022-08-16 PROCEDURE — 77387B: CUSTOM | Mod: 26

## 2022-09-20 ENCOUNTER — APPOINTMENT (OUTPATIENT)
Dept: RADIATION ONCOLOGY | Facility: CLINIC | Age: 72
End: 2022-09-20

## 2022-09-20 VITALS
TEMPERATURE: 206.6 F | DIASTOLIC BLOOD PRESSURE: 86 MMHG | SYSTOLIC BLOOD PRESSURE: 134 MMHG | HEART RATE: 79 BPM | OXYGEN SATURATION: 99 % | RESPIRATION RATE: 16 BRPM

## 2022-09-20 PROCEDURE — 99024 POSTOP FOLLOW-UP VISIT: CPT

## 2022-09-20 NOTE — HISTORY OF PRESENT ILLNESS
[FreeTextEntry1] : Mr. Campbell is a 71-year-old diagnosed with carcinoma the prostate, [place] 7 status post radical prostatectomy in January 2022. Surgical pathology identified a 4+3 cancer with extraprostatic extension and seminal vesicle invasion present. Margins were clear and 2 of 12 lymph nodes were positive. He underwent salvage EBRT to a dose of 6255 Gy from 7/12/22 - 8/16/22. Patient tolerated radiation treatment well without developing any grade 3 or higher acute toxicity as a result of his treatment.\par \par Patient presents today for post treatment follow up. He report nocturia twice per night. He denies dysuria, hematuria, urgency, hesitancy, and frequency. Patient endorse regular daily bowel function and he is not sexually active.\par \par

## 2022-09-20 NOTE — REVIEW OF SYSTEMS
[Nocturia] : nocturia [IPSS Score (0-40): ___] : IPSS score: [unfilled] [EPIC-CP Score (0-60): ___] : EPIC-CP score: [unfilled] [Negative] : Endocrine [Hematuria: Grade 0] : Hematuria: Grade 0 [Urinary Incontinence: Grade 0] : Urinary Incontinence: Grade 0  [Urinary Retention: Grade 0] : Urinary Retention: Grade 0 [Urinary Tract Pain: Grade 0] : Urinary Tract Pain: Grade 0 [Urinary Urgency: Grade 0] : Urinary Urgency: Grade 0 [Urinary Frequency: Grade 0] : Urinary Frequency: Grade 0 [Ejaculation Disorder: Grade 1 - Diminished ejaculation] : Ejaculation Disorder: Grade 1 - Diminished ejaculation  [Erectile Dysfunction: Grade 1 - Decrease in erectile function (frequency or rigidity of erections) but intervention not indicated (e.g., medication or use of mechanical device, penile pump)] : Erectile Dysfunction: Grade 1 - Decrease in erectile function (frequency or rigidity of erections) but intervention not indicated (e.g., medication or use of mechanical device, penile pump) [Urinary Frequency] : no urinary frequency

## 2022-09-21 LAB — PSA SERPL-MCNC: 0.17 NG/ML

## 2022-12-21 ENCOUNTER — APPOINTMENT (OUTPATIENT)
Dept: RADIATION ONCOLOGY | Facility: CLINIC | Age: 72
End: 2022-12-21

## 2022-12-21 VITALS
OXYGEN SATURATION: 100 % | WEIGHT: 126.76 LBS | TEMPERATURE: 97.7 F | RESPIRATION RATE: 17 BRPM | BODY MASS INDEX: 19.21 KG/M2 | SYSTOLIC BLOOD PRESSURE: 138 MMHG | HEIGHT: 68 IN | HEART RATE: 75 BPM | DIASTOLIC BLOOD PRESSURE: 74 MMHG

## 2022-12-21 PROCEDURE — 99213 OFFICE O/P EST LOW 20 MIN: CPT

## 2022-12-21 NOTE — REVIEW OF SYSTEMS
[Negative] : Endocrine [Hematuria: Grade 0] : Hematuria: Grade 0 [Urinary Incontinence: Grade 0] : Urinary Incontinence: Grade 0  [Urinary Retention: Grade 0] : Urinary Retention: Grade 0 [Urinary Tract Pain: Grade 0] : Urinary Tract Pain: Grade 0 [Urinary Urgency: Grade 0] : Urinary Urgency: Grade 0 [Urinary Frequency: Grade 0] : Urinary Frequency: Grade 0 [Ejaculation Disorder: Grade 1 - Diminished ejaculation] : Ejaculation Disorder: Grade 1 - Diminished ejaculation  [Erectile Dysfunction: Grade 1 - Decrease in erectile function (frequency or rigidity of erections) but intervention not indicated (e.g., medication or use of mechanical device, penile pump)] : Erectile Dysfunction: Grade 1 - Decrease in erectile function (frequency or rigidity of erections) but intervention not indicated (e.g., medication or use of mechanical device, penile pump) [Nocturia] : nocturia [IPSS Score (0-40): ___] : IPSS score: [unfilled] [EPIC-CP Score (0-60): ___] : EPIC-CP score: [unfilled] [Urinary Frequency] : no urinary frequency

## 2022-12-21 NOTE — HISTORY OF PRESENT ILLNESS
[FreeTextEntry1] : Mr. Campbell is a 71-year-old diagnosed with carcinoma the prostate, [place] 7 status post radical prostatectomy in January 2022. Surgical pathology identified a 4+3 cancer with extraprostatic extension and seminal vesicle invasion present. Margins were clear and 2 of 12 lymph nodes were positive. He underwent salvage EBRT to a dose of 6255 Gy from 7/12/22 - 8/16/22. Patient tolerated radiation treatment well without developing any grade 3 or higher acute toxicity as a result of his treatment.\par \par PSA\par 6/7/22   -  0.26\par 9/20/22 -  0.17\par \par Patient presents today for follow up. Patient report nocturia twice per night and occasional frequency. He denies any other urinary or bowel issues and he is not sexually active\par

## 2023-01-17 ENCOUNTER — NON-APPOINTMENT (OUTPATIENT)
Age: 73
End: 2023-01-17

## 2023-02-22 LAB — PSA SERPL-MCNC: 0.11 NG/ML

## 2023-06-26 ENCOUNTER — APPOINTMENT (OUTPATIENT)
Dept: RADIATION ONCOLOGY | Facility: CLINIC | Age: 73
End: 2023-06-26
Payer: MEDICARE

## 2023-06-26 VITALS
SYSTOLIC BLOOD PRESSURE: 145 MMHG | RESPIRATION RATE: 17 BRPM | WEIGHT: 125.44 LBS | TEMPERATURE: 97.16 F | HEART RATE: 67 BPM | OXYGEN SATURATION: 95 % | HEIGHT: 68 IN | DIASTOLIC BLOOD PRESSURE: 80 MMHG | BODY MASS INDEX: 19.01 KG/M2

## 2023-06-26 PROCEDURE — 99214 OFFICE O/P EST MOD 30 MIN: CPT

## 2023-06-26 NOTE — HISTORY OF PRESENT ILLNESS
[FreeTextEntry1] : Mr. Campbell is a 71-year-old diagnosed with carcinoma the prostate, [place] 7 status post radical prostatectomy in January 2022. Surgical pathology identified a 4+3 cancer with extraprostatic extension and seminal vesicle invasion present. Margins were clear and 2 of 12 lymph nodes were positive. He underwent salvage EBRT to a dose of 6255 Gy from 7/12/22 - 8/16/22. Patient tolerated radiation treatment well without developing any grade 3 or higher acute toxicity as a result of his treatment.\par \par PSA\par 6/7/22   -  0.26\par 9/20/22 -  0.17\par \par Patient presents today for follow up. Patient report one time nocturia per night and occasional urgency. He denies urinary leakage, hematuria, hesitancy, dysuria and weak flow. Patient endorsed regular daily bowel function and he is not sexually active. Will draw a new PSA today.\par

## 2023-06-27 LAB — PSA SERPL-MCNC: 0.08 NG/ML

## 2023-12-27 ENCOUNTER — APPOINTMENT (OUTPATIENT)
Dept: RADIATION ONCOLOGY | Facility: CLINIC | Age: 73
End: 2023-12-27
Payer: MEDICARE

## 2023-12-27 VITALS
RESPIRATION RATE: 18 BRPM | SYSTOLIC BLOOD PRESSURE: 143 MMHG | BODY MASS INDEX: 18.99 KG/M2 | HEIGHT: 68 IN | OXYGEN SATURATION: 99 % | TEMPERATURE: 96.8 F | WEIGHT: 125.33 LBS | DIASTOLIC BLOOD PRESSURE: 76 MMHG | HEART RATE: 77 BPM

## 2023-12-27 LAB — PSA SERPL-MCNC: 0.09 NG/ML

## 2023-12-27 PROCEDURE — 99214 OFFICE O/P EST MOD 30 MIN: CPT

## 2023-12-27 NOTE — REVIEW OF SYSTEMS
[IPSS Score (0-40): ___] : IPSS score: [unfilled] [EPIC-CP Score (0-60): ___] : EPIC-CP score: [unfilled] [Negative] : Endocrine [Hematuria: Grade 0] : Hematuria: Grade 0 [Urinary Incontinence: Grade 0] : Urinary Incontinence: Grade 0  [Urinary Retention: Grade 0] : Urinary Retention: Grade 0 [Urinary Tract Pain: Grade 0] : Urinary Tract Pain: Grade 0 [Urinary Urgency: Grade 0] : Urinary Urgency: Grade 0 [Urinary Frequency: Grade 0] : Urinary Frequency: Grade 0 [Ejaculation Disorder: Grade 1 - Diminished ejaculation] : Ejaculation Disorder: Grade 1 - Diminished ejaculation  [Erectile Dysfunction: Grade 1 - Decrease in erectile function (frequency or rigidity of erections) but intervention not indicated (e.g., medication or use of mechanical device, penile pump)] : Erectile Dysfunction: Grade 1 - Decrease in erectile function (frequency or rigidity of erections) but intervention not indicated (e.g., medication or use of mechanical device, penile pump) [Nocturia] : nocturia [Urinary Frequency] : no urinary frequency

## 2023-12-27 NOTE — HISTORY OF PRESENT ILLNESS
[FreeTextEntry1] : Mr. Campbell is a 71-year-old diagnosed with carcinoma the prostate, [place] 7 status post radical prostatectomy in January 2022. Surgical pathology identified a 4+3 cancer with extraprostatic extension and seminal vesicle invasion present. Margins were clear and 2 of 12 lymph nodes were positive. He underwent salvage EBRT to a dose of 6255 Gy from 7/12/22 - 8/16/22. Patient tolerated radiation treatment well without developing any grade 3 or higher acute toxicity as a result of his treatment.  PSA 6/7/22   -  0.26 9/20/22 -  0.17 6/26/23 -  0.08  Patient presents today for follow up. Patient report one-time nocturia per night and occasional frequency due to coffee or tea intake. He denies any other urinary or bowel issues and he is not sexually active. Patient is pleased with his urinary quality of life. Will get a new PSA today.

## 2024-07-02 ENCOUNTER — APPOINTMENT (OUTPATIENT)
Dept: RADIATION ONCOLOGY | Facility: CLINIC | Age: 74
End: 2024-07-02
Payer: MEDICARE

## 2024-07-02 VITALS
BODY MASS INDEX: 19.93 KG/M2 | OXYGEN SATURATION: 99 % | DIASTOLIC BLOOD PRESSURE: 78 MMHG | SYSTOLIC BLOOD PRESSURE: 154 MMHG | HEART RATE: 63 BPM | WEIGHT: 126.98 LBS | RESPIRATION RATE: 18 BRPM | TEMPERATURE: 97 F | HEIGHT: 67 IN

## 2024-07-02 DIAGNOSIS — C61 MALIGNANT NEOPLASM OF PROSTATE: ICD-10-CM

## 2024-07-02 DIAGNOSIS — C77.5 MALIGNANT NEOPLASM OF PROSTATE: ICD-10-CM

## 2024-07-02 LAB — PSA SERPL-MCNC: 0.12 NG/ML

## 2024-07-02 PROCEDURE — 99214 OFFICE O/P EST MOD 30 MIN: CPT

## 2025-01-07 ENCOUNTER — APPOINTMENT (OUTPATIENT)
Dept: RADIATION ONCOLOGY | Facility: CLINIC | Age: 75
End: 2025-01-07
Payer: MEDICARE

## 2025-01-07 VITALS
RESPIRATION RATE: 17 BRPM | HEART RATE: 72 BPM | WEIGHT: 126.21 LBS | SYSTOLIC BLOOD PRESSURE: 159 MMHG | OXYGEN SATURATION: 100 % | DIASTOLIC BLOOD PRESSURE: 79 MMHG | BODY MASS INDEX: 19.81 KG/M2 | TEMPERATURE: 96 F | HEIGHT: 67 IN

## 2025-01-07 DIAGNOSIS — C61 MALIGNANT NEOPLASM OF PROSTATE: ICD-10-CM

## 2025-01-07 DIAGNOSIS — C77.5 MALIGNANT NEOPLASM OF PROSTATE: ICD-10-CM

## 2025-01-07 LAB — PSA SERPL-MCNC: 0.17 NG/ML

## 2025-01-07 PROCEDURE — 99214 OFFICE O/P EST MOD 30 MIN: CPT

## 2025-01-29 NOTE — H&P PST ADULT - AIRWAY
Airway       Patient location during procedure: OR       Procedure Start/Stop Times: 1/29/2025 7:21 AM  Staff -        CRNA: Marah Munguia APRN CRNA       Performed By: CRNA  Consent for Airway        Urgency: elective  Indications and Patient Condition       Indications for airway management: brittaney-procedural       Induction type:intravenous       Mask difficulty assessment: 1 - vent by mask    Final Airway Details       Final airway type: endotracheal airway       Successful airway: ETT - single and Oral  Endotracheal Airway Details        ETT size (mm): 7.0       Cuffed: yes       Successful intubation technique: direct laryngoscopy       DL Blade Type: MAC 3       Grade View of Cords: 1       Position: Right       Measured from: gums/teeth       Secured at (cm): 21       Bite block used: None    Post intubation assessment        Placement verified by: capnometry, equal breath sounds and chest rise        Number of attempts at approach: 1       Number of other approaches attempted: 0       Secured with: tape       Ease of procedure: easy       Dentition: Intact and Unchanged    Medication(s) Administered   Medication Administration Time: 1/29/2025 7:21 AM        
normal

## 2025-07-08 ENCOUNTER — APPOINTMENT (OUTPATIENT)
Dept: RADIATION ONCOLOGY | Facility: CLINIC | Age: 75
End: 2025-07-08

## 2025-07-08 VITALS
SYSTOLIC BLOOD PRESSURE: 124 MMHG | WEIGHT: 125.43 LBS | OXYGEN SATURATION: 95 % | RESPIRATION RATE: 17 BRPM | HEART RATE: 67 BPM | BODY MASS INDEX: 19.69 KG/M2 | DIASTOLIC BLOOD PRESSURE: 65 MMHG | TEMPERATURE: 97 F | HEIGHT: 67 IN

## 2025-07-08 LAB — PSA SERPL-MCNC: 0.21 NG/ML

## 2025-07-08 PROCEDURE — 99214 OFFICE O/P EST MOD 30 MIN: CPT

## (undated) DEVICE — SUT VICRYL 2-0 27" SH UNDYED

## (undated) DEVICE — Device

## (undated) DEVICE — SYR CATH TIP 2 OZ

## (undated) DEVICE — SUT VICRYL 0 27" CT-1 UNDYED

## (undated) DEVICE — SUT VICRYL 2-0 36" CT-1 UNDYED

## (undated) DEVICE — SP MONOPOLAR SCISSOR CURVED 6MM

## (undated) DEVICE — PACK ROBOTIC LIJ

## (undated) DEVICE — SUT VICRYL 1 36" CT-1 UNDYED

## (undated) DEVICE — FOLEY CATH 2-WAY 20FR 5CC SILASTIC

## (undated) DEVICE — SP MARYLAND BIPOLAR FORCEP 6MM

## (undated) DEVICE — POSITIONER FOAM HEAD CRADLE (PINK)

## (undated) DEVICE — FOLEY CATH 2-WAY 16FR 5CC SILICONE

## (undated) DEVICE — GOWN XXXL

## (undated) DEVICE — DRSG TEGADERM 2.5X3"

## (undated) DEVICE — SUT VLOC 90 3-0 6" CV-23 UNDYED

## (undated) DEVICE — TROCAR GELPOINT MINI ADVANCED

## (undated) DEVICE — POSITIONER PINK PAD PIGAZZI SYSTEM

## (undated) DEVICE — GLV 7.5 PROTEXIS (CREAM) MICRO

## (undated) DEVICE — ELCTR GROUNDING PAD ADULT COVIDIEN

## (undated) DEVICE — PRESSURE INFUSOR BAG 1000ML

## (undated) DEVICE — SP OBTURATOR 100 X 25MM

## (undated) DEVICE — WARMING BLANKET UPPER ADULT

## (undated) DEVICE — SP CLIP APPLIER MEDIUM-LARGE 6MM

## (undated) DEVICE — PREP BETADINE SPONGE STICKS

## (undated) DEVICE — TUBING STRYKEFLOW II SUCTION / IRRIGATOR

## (undated) DEVICE — PACK PERI GYN

## (undated) DEVICE — FOLEY HOLDER STATLOCK 2 WAY ADULT

## (undated) DEVICE — TUBING AIRSEAL TRI-LUMEN FILTERED

## (undated) DEVICE — TUBING ROSI REMOTE VTI

## (undated) DEVICE — SUT VLOC 90 3-0 6" CV-23 VIOLET

## (undated) DEVICE — LUBRICATING JELLY ONESHOT 1.25OZ

## (undated) DEVICE — DRSG MASTISOL

## (undated) DEVICE — BAG URINE W METER 2L

## (undated) DEVICE — TROCAR SURGIQUEST AIRSEAL 12MMX100MM

## (undated) DEVICE — VENODYNE/SCD SLEEVE CALF MEDIUM

## (undated) DEVICE — SP NEEDLE DRIVER 6MM

## (undated) DEVICE — GOWN XL

## (undated) DEVICE — SUT POLYSORB 0 60" TIES UNDYED

## (undated) DEVICE — SUT MONOCRYL 4-0 27" PS-2 UNDYED

## (undated) DEVICE — SOL IRR BAG H2O 3000ML

## (undated) DEVICE — POSITIONER PURPLE ARM ONE STEP (LARGE)

## (undated) DEVICE — DRAIN RESERVOIR FOR JACKSON PRATT 100CC CARDINAL